# Patient Record
Sex: FEMALE | Race: WHITE
[De-identification: names, ages, dates, MRNs, and addresses within clinical notes are randomized per-mention and may not be internally consistent; named-entity substitution may affect disease eponyms.]

---

## 2017-02-28 ENCOUNTER — HOSPITAL ENCOUNTER (OUTPATIENT)
Dept: HOSPITAL 62 - LC | Age: 37
Discharge: HOME | End: 2017-02-28
Attending: OBSTETRICS & GYNECOLOGY
Payer: COMMERCIAL

## 2017-02-28 DIAGNOSIS — Z3A.37: ICD-10-CM

## 2017-02-28 DIAGNOSIS — O09.523: Primary | ICD-10-CM

## 2017-02-28 PROCEDURE — 4A1HXCZ MONITORING OF PRODUCTS OF CONCEPTION, CARDIAC RATE, EXTERNAL APPROACH: ICD-10-PCS | Performed by: OBSTETRICS & GYNECOLOGY

## 2017-02-28 PROCEDURE — 59025 FETAL NON-STRESS TEST: CPT

## 2017-02-28 NOTE — NON STRESS TEST REPORT
=================================================================

Non Stress Test

=================================================================

Datetime Report Generated by CPN: 02/28/2017 11:15

   

   

=================================================================

DEMOGRAPHIC

=================================================================

   

EGA NST:  37.2

   

=================================================================

INDICATION

=================================================================

   

Indication for Study:  Other

Indication for Study (NST) Other:  AMA

   

=================================================================

MONITORING

=================================================================

   

Monitor Explained:  Monitor Explained; Test Explained; Patient

   Verbalized Understanding

Time on Monitor:  02/28/2017 10:46

Time off Monitor:  02/28/2017 11:10

NST Duration:  24

   

=================================================================

NST INTERVENTIONS

=================================================================

   

NST Interventions:  PO Hydration; Reposition Patient

Physician Notified NST:  A EMMEL, CNM

BABY A:  [id number]

   

=================================================================

BABY A

=================================================================

   

Fetal Movement :  Present

Contraction Frequency :  NONE

FHR Baseline :  135

Accelerations :  15X15

Decelerations :  None

Variability :  Moderate 6-25bpm

NST Review:  Meets Criteria for Reactive NST

NST Review and Verified By :  LAM Barnesavadominga RNC

NST Results:  Reactive

   

=================================================================

NST REPORT

=================================================================

   

Report Trigger:  Send Report

## 2017-03-14 ENCOUNTER — HOSPITAL ENCOUNTER (OUTPATIENT)
Dept: HOSPITAL 62 - LC | Age: 37
Discharge: HOME | End: 2017-03-14
Attending: OBSTETRICS & GYNECOLOGY
Payer: COMMERCIAL

## 2017-03-14 DIAGNOSIS — Z3A.39: ICD-10-CM

## 2017-03-14 DIAGNOSIS — O09.523: Primary | ICD-10-CM

## 2017-03-14 PROCEDURE — 59025 FETAL NON-STRESS TEST: CPT

## 2017-03-14 PROCEDURE — 4A1HXCZ MONITORING OF PRODUCTS OF CONCEPTION, CARDIAC RATE, EXTERNAL APPROACH: ICD-10-PCS | Performed by: OBSTETRICS & GYNECOLOGY

## 2017-03-22 ENCOUNTER — HOSPITAL ENCOUNTER (INPATIENT)
Dept: HOSPITAL 62 - LR | Age: 37
LOS: 3 days | Discharge: HOME | End: 2017-03-25
Attending: OBSTETRICS & GYNECOLOGY | Admitting: OBSTETRICS & GYNECOLOGY
Payer: COMMERCIAL

## 2017-03-22 DIAGNOSIS — Z3A.40: ICD-10-CM

## 2017-03-22 DIAGNOSIS — O48.0: Primary | ICD-10-CM

## 2017-03-22 LAB
ALBUMIN SERPL-MCNC: 3.9 G/DL (ref 3.5–5)
ALP SERPL-CCNC: 116 U/L (ref 38–126)
ALT SERPL-CCNC: 23 U/L (ref 9–52)
ANION GAP SERPL CALC-SCNC: 10 MMOL/L (ref 5–19)
APPEARANCE UR: (no result)
AST SERPL-CCNC: 19 U/L (ref 14–36)
BARBITURATES UR QL SCN: NEGATIVE
BASOPHILS # BLD AUTO: 0.1 10^3/UL (ref 0–0.2)
BASOPHILS NFR BLD AUTO: 0.5 % (ref 0–2)
BILIRUB DIRECT SERPL-MCNC: 0.1 MG/DL (ref 0–0.4)
BILIRUB SERPL-MCNC: 0.4 MG/DL (ref 0.2–1.3)
BILIRUB UR QL STRIP: NEGATIVE
BUN SERPL-MCNC: 9 MG/DL (ref 7–20)
CALCIUM: 10.5 MG/DL (ref 8.4–10.2)
CHLORIDE SERPL-SCNC: 106 MMOL/L (ref 98–107)
CO2 SERPL-SCNC: 23 MMOL/L (ref 22–30)
CREAT SERPL-MCNC: 0.46 MG/DL (ref 0.52–1.25)
EOSINOPHIL # BLD AUTO: 0.1 10^3/UL (ref 0–0.6)
EOSINOPHIL NFR BLD AUTO: 1.3 % (ref 0–6)
ERYTHROCYTE [DISTWIDTH] IN BLOOD BY AUTOMATED COUNT: 14.5 % (ref 11.5–14)
GLUCOSE SERPL-MCNC: 91 MG/DL (ref 75–110)
GLUCOSE UR STRIP-MCNC: NEGATIVE MG/DL
HCT VFR BLD CALC: 38.2 % (ref 36–47)
HGB BLD-MCNC: 12.6 G/DL (ref 12–15.5)
HGB HCT DIFFERENCE: -0.4
KETONES UR STRIP-MCNC: NEGATIVE MG/DL
LDH1 SERPL-CCNC: 321 U/L (ref 313–618)
LYMPHOCYTES # BLD AUTO: 2.1 10^3/UL (ref 0.5–4.7)
LYMPHOCYTES NFR BLD AUTO: 18.2 % (ref 13–45)
MCH RBC QN AUTO: 31.2 PG (ref 27–33.4)
MCHC RBC AUTO-ENTMCNC: 33.1 G/DL (ref 32–36)
MCV RBC AUTO: 94 FL (ref 80–97)
METHADONE UR QL SCN: NEGATIVE
MONOCYTES # BLD AUTO: 1 10^3/UL (ref 0.1–1.4)
MONOCYTES NFR BLD AUTO: 8.7 % (ref 3–13)
NEUTROPHILS # BLD AUTO: 8 10^3/UL (ref 1.7–8.2)
NEUTS SEG NFR BLD AUTO: 71.3 % (ref 42–78)
NITRITE UR QL STRIP: NEGATIVE
PCP UR QL SCN: NEGATIVE
PH UR STRIP: 7 [PH] (ref 5–9)
POTASSIUM SERPL-SCNC: 4.7 MMOL/L (ref 3.6–5)
PROT SERPL-MCNC: 6.7 G/DL (ref 6.3–8.2)
PROT UR STRIP-MCNC: NEGATIVE MG/DL
RBC # BLD AUTO: 4.05 10^6/UL (ref 3.72–5.28)
SODIUM SERPL-SCNC: 139.1 MMOL/L (ref 137–145)
SP GR UR STRIP: 1.01
URATE SERPL-MCNC: 4.1 MG/DL (ref 2.5–7)
URINE OPIATES LOW: NEGATIVE
UROBILINOGEN UR-MCNC: NEGATIVE MG/DL (ref ?–2)
WBC # BLD AUTO: 11.3 10^3/UL (ref 4–10.5)

## 2017-03-22 PROCEDURE — 80053 COMPREHEN METABOLIC PANEL: CPT

## 2017-03-22 PROCEDURE — 36415 COLL VENOUS BLD VENIPUNCTURE: CPT

## 2017-03-22 PROCEDURE — 86900 BLOOD TYPING SEROLOGIC ABO: CPT

## 2017-03-22 PROCEDURE — 84550 ASSAY OF BLOOD/URIC ACID: CPT

## 2017-03-22 PROCEDURE — 81001 URINALYSIS AUTO W/SCOPE: CPT

## 2017-03-22 PROCEDURE — 86592 SYPHILIS TEST NON-TREP QUAL: CPT

## 2017-03-22 PROCEDURE — 85027 COMPLETE CBC AUTOMATED: CPT

## 2017-03-22 PROCEDURE — 83615 LACTATE (LD) (LDH) ENZYME: CPT

## 2017-03-22 PROCEDURE — 86901 BLOOD TYPING SEROLOGIC RH(D): CPT

## 2017-03-22 PROCEDURE — 86850 RBC ANTIBODY SCREEN: CPT

## 2017-03-22 PROCEDURE — 80307 DRUG TEST PRSMV CHEM ANLYZR: CPT

## 2017-03-22 PROCEDURE — 85025 COMPLETE CBC W/AUTO DIFF WBC: CPT

## 2017-03-22 NOTE — L&D FLOW SHEET
=================================================================

LD Flowsheet

=================================================================

Datetime Report Generated by CPN: 03/22/2017 20:00

   

   

=================================================================

Datetime: 03/22/2017 19:52

=================================================================

   

   

=================================================================

Medications

=================================================================

   

 Cervical Ripening Agents:  Cytotec @ 50 mcg PO _ 25 mch PV (Crystal

   [place], RN)

   

=================================================================

Datetime: 03/22/2017 19:48

=================================================================

   

   

=================================================================

Patient Care

=================================================================

   

 I/O Interventions:  Up to BR (Crystal [place], RN)

   

=================================================================

Datetime: 03/22/2017 19:18

=================================================================

   

   

=================================================================

Maternal Assessment

=================================================================

   

 Level of Consciousness:  Fully Conscious (Crystal [place], RN)

 Headache:  Denies (Crystal Blair, RN)

 Breath Sounds, Left:  Clear and Equal (Crystal Blair, RN)

 Breath Sounds, Right:  Clear and Equal (Crystal [place], RN)

 Nausea/Vomiting:  Denies (Crystal Blair, RN)

 RUQ Epigastric Pain:  Denies (Crystal [place], RN)

   

=================================================================

Datetime: 03/22/2017 19:10

=================================================================

   

 Patient Care Comments:  18g inserted into left forearm, infusing

   without difficulty (Dorothy Broman, RN)

   

=================================================================

Datetime: 03/22/2017 19:02

=================================================================

   

   

=================================================================

Vital Signs

=================================================================

   

 NBP Sys/Florence/Mean (mmHg):  125 (QS system process)

:  85 (QS system process)

:  100 (QS system process)

 Pulse:  93 (QS system process)

   

=================================================================

Communication

=================================================================

   

 LaborFlag:  OB Triage (QS system process)

   

=================================================================

Datetime: 03/22/2017 18:58

=================================================================

   

   

=================================================================

Uterine Activity

=================================================================

   

 Monitor Interventions for UA:  [place] Adjusted (Dorothy Broman, RN)

   

=================================================================

Fetal Assessment A

=================================================================

   

 Monitor Mode:  External US (Dorothy Broman, RN)

 Monitor Interventions for FHR:  Ultrasound Adjusted (Dorothy Broman,

   RN)

 FHR Baseline Rate :  130 (Dorothy Broman, RN)

 Variability:  Moderate 6-25 bpm (Dorothy Broman, RN)

 Accelerations:  15X15 (Dorothy Broman, RN)

 Decelerations:  None (Dorothy Broman, RN)

   

=================================================================

Pain

=================================================================

   

 Pain Scale:  0 (Dorothy Broman, RN)

 Pain Presence:  None/Denies (Dorothy Broman, RN)

 Pain Type:  N/A (Dorothy Broman, RN)

 Pain Relief Measures:  Comfort Measures (Dorothy Broman, RN)

   

=================================================================

Vaginal Exam

=================================================================

   

 Vaginal Bleeding:  None (Dorothy Broman, RN)

   

=================================================================

Maternal Assessment

=================================================================

   

 Level of Consciousness:  Fully Conscious (Dorothy Broman, RN)

 DTR's/Clonus:  DTRs 2+; No Clonus (Dorothy Broman, RN)

 Headache:  Denies (Dorothy Broman, RN)

 Breath Sounds, Left:  Clear and Equal (Dorothy Broman, RN)

 Breath Sounds, Right:  Clear and Equal (Dorothy Broman, RN)

 Nausea/Vomiting:  Denies (Dorothy Broman, RN)

 RUQ Epigastric Pain:  Denies (Dorothy Broman, RN)

   

=================================================================

Teaching

=================================================================

   

 Instructional Method:  Verbal (Dorothy Sharma RN)

 Plan of Care:  Plan of Care Discussed; Vaginal Delivery; Labor;

   Induction (Dorothy Sharma RN)

 Unit Routine:  Maria Stein to Room; Call Barnes; Bed; Visiting Policy;

   Waiting Areas (Dorothy Sharma RN)

 Labor/Induction:  Labor Stages; Cervical Ripening; Induction (Dorothy

   Broman, RN)

 Pain Management:  Pain Scale/Goals; Comfort Measures (Dorothy Sharma,

   RN)

 PTL/PROM:  Signs/Symptoms of Infection (Dorothy Sharma, RN)

 Pregnancy Related:  Common Discomforts of Pregnancy; Hydration;

   Activity and Rest (Dorothy Sharma, IZABELLA)

   

=================================================================

Communication

=================================================================

   

 LaborFlag:  OB Triage (QS system process)

   

=================================================================

Datetime: 03/14/2017 16:03

=================================================================

   

 Temperature (C):  36.7 (QS system process)

   

=================================================================

Communication

=================================================================

   

 LaborFlag:  OB Triage (QS system process)

## 2017-03-22 NOTE — L&D FLOW SHEET
=================================================================

LD Flowsheet

=================================================================

Datetime Report Generated by CPN: 03/22/2017 20:00

   

   

=================================================================

Datetime: 03/22/2017 19:52

=================================================================

   

   

=================================================================

Medications

=================================================================

   

 Cervical Ripening Agents:  Cytotec @ 50 mcg PO _ 25 mch PV (Crystal

   [place], RN)

   

=================================================================

Datetime: 03/22/2017 19:48

=================================================================

   

   

=================================================================

Patient Care

=================================================================

   

 I/O Interventions:  Up to BR (Crystal [place], RN)

   

=================================================================

Datetime: 03/22/2017 19:18

=================================================================

   

   

=================================================================

Maternal Assessment

=================================================================

   

 Level of Consciousness:  Fully Conscious (Crystal [place], RN)

 Headache:  Denies (Crystal Blair, RN)

 Breath Sounds, Left:  Clear and Equal (Crystal Blair, RN)

 Breath Sounds, Right:  Clear and Equal (Crystal [place], RN)

 Nausea/Vomiting:  Denies (Crystal Blair, RN)

 RUQ Epigastric Pain:  Denies (Crystal [place], RN)

   

=================================================================

Datetime: 03/22/2017 19:10

=================================================================

   

 Patient Care Comments:  18g inserted into left forearm, infusing

   without difficulty (Dorothy Broman, RN)

   

=================================================================

Datetime: 03/22/2017 19:02

=================================================================

   

   

=================================================================

Vital Signs

=================================================================

   

 NBP Sys/Florence/Mean (mmHg):  125 (QS system process)

:  85 (QS system process)

:  100 (QS system process)

 Pulse:  93 (QS system process)

   

=================================================================

Communication

=================================================================

   

 LaborFlag:  OB Triage (QS system process)

   

=================================================================

Datetime: 03/22/2017 18:58

=================================================================

   

   

=================================================================

Uterine Activity

=================================================================

   

 Monitor Interventions for UA:  [place] Adjusted (Dorothy Broman, RN)

   

=================================================================

Fetal Assessment A

=================================================================

   

 Monitor Mode:  External US (Dorothy Broman, RN)

 Monitor Interventions for FHR:  Ultrasound Adjusted (Dorothy Broman,

   RN)

 FHR Baseline Rate :  130 (Dorothy Broman, RN)

 Variability:  Moderate 6-25 bpm (Dorothy Broman, RN)

 Accelerations:  15X15 (Dorothy Broman, RN)

 Decelerations:  None (Dorothy Broman, RN)

   

=================================================================

Pain

=================================================================

   

 Pain Scale:  0 (Dorothy Broman, RN)

 Pain Presence:  None/Denies (Dorothy Broman, RN)

 Pain Type:  N/A (Dorothy Broman, RN)

 Pain Relief Measures:  Comfort Measures (Dorothy Broman, RN)

   

=================================================================

Vaginal Exam

=================================================================

   

 Vaginal Bleeding:  None (Dorothy Broman, RN)

   

=================================================================

Maternal Assessment

=================================================================

   

 Level of Consciousness:  Fully Conscious (Dorothy Broman, RN)

 DTR's/Clonus:  DTRs 2+; No Clonus (Dorothy Broman, RN)

 Headache:  Denies (Dorothy Broman, RN)

 Breath Sounds, Left:  Clear and Equal (Dorothy Broman, RN)

 Breath Sounds, Right:  Clear and Equal (Dorothy Broman, RN)

 Nausea/Vomiting:  Denies (Dorothy Broman, RN)

 RUQ Epigastric Pain:  Denies (Dorothy Broman, RN)

   

=================================================================

Teaching

=================================================================

   

 Instructional Method:  Verbal (Dorothy Sharma RN)

 Plan of Care:  Plan of Care Discussed; Vaginal Delivery; Labor;

   Induction (Dorothy Sharma RN)

 Unit Routine:  White to Room; Call Barnes; Bed; Visiting Policy;

   Waiting Areas (Dorothy Sharma RN)

 Labor/Induction:  Labor Stages; Cervical Ripening; Induction (Dorothy Sharma RN)

 Pain Management:  Pain Scale/Goals; Comfort Measures (Dorothy Sharma RN)

 PTL/PROM:  Signs/Symptoms of Infection (Dorothy Sharma RN)

 Pregnancy Related:  Common Discomforts of Pregnancy; Hydration;

   Activity and Rest (Dorothy Sharma RN)

   

=================================================================

Communication

=================================================================

   

 LaborFlag:  OB Triage (QS system process)

## 2017-03-23 PROCEDURE — 4A1HXCZ MONITORING OF PRODUCTS OF CONCEPTION, CARDIAC RATE, EXTERNAL APPROACH: ICD-10-PCS | Performed by: OBSTETRICS & GYNECOLOGY

## 2017-03-23 PROCEDURE — 3E0P7GC INTRODUCTION OF OTHER THERAPEUTIC SUBSTANCE INTO FEMALE REPRODUCTIVE, VIA NATURAL OR ARTIFICIAL OPENING: ICD-10-PCS | Performed by: OBSTETRICS & GYNECOLOGY

## 2017-03-23 PROCEDURE — 0HQ9XZZ REPAIR PERINEUM SKIN, EXTERNAL APPROACH: ICD-10-PCS | Performed by: OBSTETRICS & GYNECOLOGY

## 2017-03-23 RX ADMIN — ACETAMINOPHEN AND CODEINE PHOSPHATE PRN EACH: 300; 30 TABLET ORAL at 21:55

## 2017-03-23 RX ADMIN — IBUPROFEN SCH MG: 800 TABLET, FILM COATED ORAL at 21:11

## 2017-03-23 NOTE — ADMISSION PHYSICAL
=================================================================



=================================================================

Datetime Report Generated by CPN: 2017 18:23

   

   

=================================================================

CURRENT ADMISSION

=================================================================

   

Chief Complaint:  Scheduled Induction of Labor

Indication for Induction:  Post Dates; Gest. HTN/PreEclampsia/Eclampsia

Admit Plan:  Admit to Unit; Initiate Labor Induction Protocol

   

=================================================================

ALLERGIES

=================================================================

   

Medication Allergies:  No

Medication Allergies:  No Known Allergies (2017)

Latex:  No Latex Allergies

Food Allergies:  none

Environmental Allergies:  none

   

=================================================================

OBSTETRICAL HISTORY

=================================================================

   

EDC:  2017 00:00

:  4

Para:  2

Term:  2

SAB:  1

Livin

Cesareans:  0

VBACs:  0

Multiple Births:  0

Gestational Diabetes:  No

Rh Sensitization:  No

Incompetent Cervix:  No

MELISSA:  No

Infertility:  No

ART Treatment:  No

Uterine Anomaly:  No

IUGR:  No

Hx Previous C/S:  No

Macrosomia:  No

Hx Loss/Stillborn:  No

PIH:  No

Hx  Death:  No

Placenta Previa/Abruption:  No

Depression/PP Depression:  No

PTL/PROM:  No

Post Partum Hemorrhage:  No

Current Pregnancy Procedures:  Ultrasound; NST

Obstetrical History Comments:  G1 2007 

   G2 2009 

   G3  SAB

   G4 current GHTN, AMA

      

   

=================================================================

***SEE PRENATAL RECORDS***

=================================================================

   

Alcohol:  No

Marijuana :  No

Cocaine:  No

Other Illicit Drugs:  No

Cigarettes:  Never Smoker. [id number]

   

=================================================================

MEDICAL HISTORY

=================================================================

   

Diabetes:  No

Blood Transfusion:  No

Pulmonary Disease (Asthma, TB):  No

Breast Disease:  No

Hypertension:  No

Gyn Surgery:  No

Heart Disease:  No

Hosp/Surgery:  No

Autoimmune Disorder:  No

Anesthetic Complications:  No

Kidney Disease:  No

Abnormal Pap Smear:  No

Neuro/Epilepsy:  No

Psychiatric Disorders:  No

Other Medical Diseases:  No

Hepatitis/Liver Disease:  No

Significant Family History:  No

Varicosities/Phlebitis:  No

Trauma/Violence :  No

Thyroid Dysfunction:  No

   

=================================================================

INFECTIOUS HISTORY

=================================================================

   

Gonorrhea:  No

Genital Herpes:  No

Chlamydia:  No

Tuberculosis:  No

Syphilis:  No

Hepatitis:  No

HIV/AIDS Exposure:  No

Rash or Viral Illness:  No

HPV:  No

   

=================================================================

PHYSICAL EXAM

=================================================================

   

General:  Normal

HEENT:  Normal

Neurologic:  Normal

Thyroid:  Normal

Heart:  Normal

Lungs:  Normal

Breast:  Normal

Back:  Normal

Abdomen:  Normal

Genitourinary Exam:  Normal

Extremities:  Normal

DTRs:  Normal

Pelvic Type:  Adequate

Vital Signs:  Reviewed; Within Normal Limits

   

=================================================================

VAGINAL EXAM

=================================================================

   

Dilatation:  1

Effacement:  25

Station:  -3

   

=================================================================

MEMBRANES

=================================================================

   

Pooling:  Negative

Membranes:  Intact

   

=================================================================

FETUS A

=================================================================

   

EGA:  40.3

Monitoring:  External US

FHR- Baseline:  150

Variability:  Moderate 6-25bpm

Accelerations:  15X15

Decelerations:  None

FHR Category:  Category I

Estimated Fetal Weight (gm):  4000

Fetal Presentation:  Vertex

   

=================================================================

PLANS FOR LABOR AND DELIVERY

=================================================================

   

Labor and Delivery:  None

Pain Management:  None

Feeding Preference:  Formula

Benefit of Breast Feed Discussed:  Yes

Circumcision:  N/A

   

=================================================================

INFORMED CONSENT

=================================================================

   

Signature:  Electronically signed by MD Lesa WaggonerANDDO) on

   3/22/2017 at 19:46  with User ID: [email or web address]

## 2017-03-23 NOTE — DELIVERY SUMMARY
=================================================================

Del Sum A-C

=================================================================

Datetime Report Generated by CPN: 2017 17:16

   

   

=================================================================

ADMISSION DATA

=================================================================

   

Chief Complaint:  Scheduled Induction of Labor

Indication for Induction:  Post Dates; Gest. HTN/PreEclampsia/Eclampsia

Admission Impression:  Term, Intrauterine Pregnancy; Induction of Labor

   

=================================================================

DELIVERY PERSONNEL

=================================================================

   

Delivery Doctor::  Lenore Poe CNM

Labor and Delivery Nurse::  Dorothy Sharma, RN

Labor and Delivery Nurse::  Nancy Casper, RN

Scrub Tech/CNA:  Marry Perez, ST

   

=================================================================

MATERNAL INFORMATION

=================================================================

   

Delivery Anesthesia:  None

Medications After Delivery:  Pitocin Drip 20 Units/1000ml NSS

Estimated  Blood Loss (ml):  200

Maternal Complications:  None

Provider Comments:  SVDVF with loose nuchal cord reduced.  Infant

   vigorous, to mothers abd.  Cord clamped x2 cut per FOB, 3VC, cord

   blood collected.  Placenta intact via merrill with trailing

   membranes.  1*perineal lac repaired with 1%lidocaine and 3.0

   chromic.  , Apgars 9,9.  Mother and infant stable. 

   

=================================================================

LABOR SUMMARY

=================================================================

   

EDC:  2017 00:00

No. Babies in Womb:  1

 Attempted:  No

Labor Anesthesia:  None

   

=================================================================

LABOR INFORMATION

=================================================================

   

Reason for Induction:  Postterm; Gestational Hypertension

Onset of Labor:  2017 12:09

Complete Dilatation:  2017 15:38

Cervical Ripening Agents:  Cytotec @

Oxytocin:  Induction

Group B Beta Strep:  Neg

Antibiotics # of Doses:  0

Steroids Given:  None

Reason Steroids Not Administered:  Not Applicable

   

=================================================================

MEMBRANES

=================================================================

   

Membranes Rupture Method:  Artificial

Rupture of Membranes:  2017 12:09

Length of Rupture (hr):  3.53

Amniotic Fluid Color:  Clear

Amniotic Fluid Amount:  Large

Amniotic Fluid Odor:  Normal

   

=================================================================

STAGES OF LABOR

=================================================================

   

Stage 1 hr:  3

Stage 1 min:  29

Stage 2 hr:  0

Stage 2 min:  3

Stage 3 hr:  0

Stage 3 min:  7

Total Time in Labor hr:  3

Total Time in Labor min:  39

   

=================================================================

VAGINAL DELIVERY

=================================================================

   

Episiotomy:  None

Laceration Extension:  First Degree

Laceration Type:  Perineal

Laceration Repair:  Yes

Laceration Repair Note:  3.0 chromic, 2 interrupted stitches 1*perineal

Sponge Count Correct:  N/A

   

=================================================================

BABY A INFORMATION

=================================================================

   

Infant Delivery Date/Time:  2017 15:41

Method of Delivery:  Vaginal

Born in Route :  No

:  N/A

Forceps:  N/A

Vacuum Extraction:  N/A

Shoulder Dystocia :  No

   

=================================================================

PRESENTATION/POSITION BABY A

=================================================================

   

Presentation:  Cephalic

Cephalic Presentation:  Vertex

Vertex Position:  Left Occipital Anterior

Breech Presentation:  N/A

   

=================================================================

PLACENTA INFORMATION BABY A

=================================================================

   

Placenta Delivery Time :  2017 15:48

Placenta Method of Delivery:  Spontaneous

Placenta Status:  Delivered

   

=================================================================

APGAR SCORES BABY A

=================================================================

   

Heart Rate 1 min:  >100 bpm

Resp Effort 1 min:  Good Cry

Reflex Irritability 1 min:  Cough or Sneeze or Pulls Away

Muscle Tone 1 min:  Active Motion

Color 1 min:  Body Pink, Extremities Blue

Resuscitation Effort 1 min:  N/A

APGAR SCORE 1 MIN:  9

Heart Rate 5 min:  >100 bpm

Resp Effort 5 min:  Good Cry

Reflex Irritability 5 min:  Cough or Sneeze or Pulls Away

Muscle Tone 5 min:  Active Motion

Color 5 min:  Body Pink, Extremities Blue

Resuscitation Effort 5 min:  N/A

APGAR SCORE 5 MIN:  9

   

=================================================================

INFANT INFORMATION BABY A

=================================================================

   

Gestational Age at Delivery:  40.4

Gestational Status:  Full Term- 39- 40.6 Weeks

Infant Outcome :  Liveborn

Infant Condition :  Stable

Infant Sex:  Female

   

=================================================================

IDENTIFICATION BABY A

=================================================================

   

Infant Verification Date/Time:  2017 15:56

ID Band Number:  [id number]

Mother's Name Verified:  Yes

Infant Medical Record Number:  [id number]

RN Verifying Infant:  B Roulund RN/ S Camp RNC

   

=================================================================

WEIGHT/LENGTH BABY A

=================================================================

   

Infant Birthweight (gm):  3300

Infant Weight (lb):  7

Infant Weight (oz):  4

Infant Length (in):  20.00

Infant Length (cm):  50.80

   

=================================================================

CORD INFORMATION BABY A

=================================================================

   

No. Cord Vessels:  3

Nuchal Cord :  Around Neck x1, Loose

Cord Blood Taken:  Yes-For Storage (Mom's Blood type +)

Infant Suction:  None

   

=================================================================

ASSESSMENT BABY A

=================================================================

   

Infant Complications:  None

Physical Findings at Delivery:  Within Normal Limits

Infant Respirations:  Appears Normal

Skin to Skin:  Yes

Skin to Skin Time (min):  5

Neonatologist/ALS Called :  No

Infant Care By:  PRACHI Sharma RN

Transferred To:  Remains with Mother

   

=================================================================

SIGNATURES

=================================================================

   

Assignment:  Mary Strong MD

Signature:  Electronically signed by Lenore Poe CNM on 3/23/2017 at

   16:07  with User ID: [email or web address]

:  Electronically signed by Lenore Poe CNM on 3/23/2017 at 16:07 

   with User ID: [email or web address]

:  I was personally available for consultation and serving as

   supervising physician for the MLP.

## 2017-03-23 NOTE — L&D FLOW SHEET
=================================================================

LD Flowsheet

=================================================================

Datetime Report Generated by CPN: 03/23/2017 08:00

   

   

=================================================================

Datetime: 03/23/2017 07:49

=================================================================

   

 Respirations:  18 (Dorothy Broman, RN)

 Temperature (F):  97.9 (Dorothy Broman, RN)

 Temperature (C):  36.6 (QS system process)

 Temperature Route:  Oral (Dorothy Broman, RN)

 Pain Scale:  2 (Dorothy Broman, RN)

 Pain Presence:  Intermittent (Dorothy Broman, RN)

 Pain Type:  Cramping (Dorothy Broman, RN)

 Pain Location:  Abdomen (Dorothy Broman, RN)

 Pain Goal:  0 (Dorothy Broman, RN)

 Pain Relief Measures:  Comfort Measures (Dorothy Broman, RN)

 Pain Coping:  Declines Medication or Epidural (Dorothy Broman, RN)

 LaborFlag:  Labor (QS system process)

   

=================================================================

Datetime: 03/23/2017 07:48

=================================================================

   

 Level of Consciousness:  Fully Conscious (Dorothy Broman, RN)

 DTR's/Clonus:  DTRs 2+; No Clonus (Dorothy Broman, RN)

 Headache:  Denies (Dorothy Broman, RN)

 Breath Sounds, Left:  Clear and Equal (Dorothy Broman, RN)

 Breath Sounds, Right:  Clear and Equal (Dorothy Broman, RN)

 Nausea/Vomiting:  Denies (Dorothy Broman, RN)

 RUQ Epigastric Pain:  Denies (Dorothy Broman, RN)

   

=================================================================

Datetime: 03/23/2017 06:26

=================================================================

   

 Monitor Mode:  External (Crystal Blair, RN)

 Frequency (min):  2-5 (Crystal White Bird, RN)

 Quality:  Mild/Moderate (Crystal White Bird, RN)

 Duration (sec):  60-90 (Crystal White Bird, RN)

 Duration Criteria:  Less than Two 120 Second Contractions (Crystal

   Blair, RN)

 Resting Tone (Palpate):  Relaxed (Crystal White Bird, RN)

 Monitor Mode:  External US (Crystal Blair, RN)

 FHR Baseline Rate :  120 (Crystal Blair, RN)

 Variability:  Moderate 6-25 bpm (Crystal Blair, RN)

 Communication Comments:  Monitors DC for pt to shower and eat

   breakfast.  Will resume monitoring after breakfast.  (Crystal

   White Bird, RN)

   

=================================================================

Datetime: 03/23/2017 06:25

=================================================================

   

 I/O Interventions:  Up to BR (Crystal White Bird, RN)

   

=================================================================

Datetime: 03/23/2017 06:14

=================================================================

   

 NBP Sys/Florence/Mean (mmHg):  129 (QS system process)

:  74 (QS system process)

:  95 (QS system process)

 Pulse:  77 (QS system process)

 LaborFlag:  Labor (QS system process)

   

=================================================================

Datetime: 03/23/2017 06:12

=================================================================

   

Stage of Pregnancy:  Labor (Crystal Blair, RN)

 Strip Reviewed by:  AMERICA Pinto RN  (Nelly Pinto RN)

 Communication:  RN Reviewed Strip; Provider Orders Received; Call/Page

   Placed to Provider (Nelly Pinto RN)

 Provider Notified (Name):  Jayro (Nelly Pinto RN)

 Notification Reason:  Status Update (Nelly Pinto RN)

 Communication Comments:  VE report given to Dr. Dial , received

   orders to start pitocin IV.  (Nelly Pinto RN)

   

=================================================================

Datetime: 03/23/2017 06:03

=================================================================

   

 Dilatation (cm):  1.0 (Nelly Pinto RN)

 Effacement (%):  25 (Nelly Pinto RN)

 Station:  -3 (Nelly Pinto RN)

 Exam by:  AMERICA Pinto RN  (Nelly Pinto RN)

 Vaginal Bleeding:  None (Nelly Pinto RN)

 Cervix, Consistency:  Soft (Nelly Pinto RN)

 Cervix, Position:  Posterior (Nelly Pinto RN)

   

=================================================================

Datetime: 03/23/2017 06:00

=================================================================

   

 Monitor Mode:  External; Palpation (Crystal White Bird, RN)

 Frequency (min):  1.5-4 (Crystal Blair, RN)

 Quality:  Mild/Moderate (Crystal Blair, RN)

 Duration (sec):  70-90 (Crystal White Bird, RN)

 Duration Criteria:  Less than Two 120 Second Contractions (Crystal

   Blair, RN)

 Resting Tone (Palpate):  Relaxed (Crystal Blair, RN)

 Monitor Mode:  External US (Crystal Blair, RN)

 FHR Baseline Rate :  125 (Crystal White Bird, RN)

 Variability:  Moderate 6-25 bpm (Crystal Blair, RN)

 Accelerations:  15X15 (Crystal White Bird, RN)

   

=================================================================

Datetime: 03/23/2017 05:47

=================================================================

   

 Monitor Interventions for FHR:  Ultrasound Adjusted (Crystal

   Blair, RN)

   

=================================================================

Datetime: 03/23/2017 05:33

=================================================================

   

 I/O Interventions:  Up to BR (Crystal White Bird, RN)

   

=================================================================

Datetime: 03/23/2017 05:30

=================================================================

   

 Monitor Mode:  External (Crystal Blair, RN)

 Frequency (min):  1-3 (Crystal Blair, RN)

 Quality:  Mild/Moderate (Crystal White Bird, RN)

 Duration (sec):  70-90 (Crystal White Bird, RN)

 Duration Criteria:  Less than Two 120 Second Contractions (Crystal

   White Bird, RN)

 Resting Tone (Palpate):  Relaxed (Crystal White Bird, RN)

 Monitor Mode:  External US (Crystal Blair, RN)

 FHR Baseline Rate :  120 (Crystal Blair, RN)

 Variability:  Moderate 6-25 bpm (Crystal White Bird, RN)

   

=================================================================

Datetime: 03/23/2017 05:13

=================================================================

   

 NBP Sys/Florence/Mean (mmHg):  118 (QS system process)

:  71 (QS system process)

:  89 (QS system process)

 Pulse:  74 (QS system process)

 LaborFlag:  OB Triage (QS system process)

   

=================================================================

Datetime: 03/23/2017 05:00

=================================================================

   

 Monitor Mode:  External; Palpation (Crystal White Bird, RN)

 Frequency (min):  1-4 (Crystal White Bird, RN)

 Quality:  Mild/Moderate (Crystal Blair, RN)

 Duration (sec):  60-80 (Crystal White Bird, RN)

 Duration Criteria:  Less than Two 120 Second Contractions (Crystal

   White Bird, RN)

 Resting Tone (Palpate):  Relaxed (Crystal White Bird, RN)

 Monitor Mode:  External US (Crystal Blair, RN)

 FHR Baseline Rate :  120 (Crystal White Bird, RN)

 Variability:  Moderate 6-25 bpm (Crystal Blair, RN)

 Accelerations:  15X15 (Crystal White Bird, RN)

 Patient Position/Activity:  Semi-Fowlers (Crystal Blair, RN)

   

=================================================================

Datetime: 03/23/2017 04:30

=================================================================

   

 Monitor Mode:  External; Palpation (Crystal Blair, RN)

 Frequency (min):  1-3.5 (Crystal White Bird, RN)

 Quality:  Mild/Moderate (Crystal White Bird, RN)

 Duration (sec):   (Crystal White Bird, RN)

 Duration Criteria:  Less than Two 120 Second Contractions (Crystal

   White Bird, RN)

 Resting Tone (Palpate):  Relaxed (Crystal White Bird, RN)

 Monitor Mode:  External US (Crystal White Bird, RN)

 FHR Baseline Rate :  120 (Crystal Blair, RN)

 Variability:  Moderate 6-25 bpm (Crystal White Bird, RN)

 Accelerations:  15X15 (Crystal Blair, RN)

   

=================================================================

Datetime: 03/23/2017 04:15

=================================================================

   

 Monitor Interventions for FHR:  Ultrasound Adjusted (Crystal

   White Bird, RN)

 Comments:  Reading moms pulse (Crystal Blair, RN)

   

=================================================================

Datetime: 03/23/2017 04:13

=================================================================

   

 NBP Sys/Florence/Mean (mmHg):  128 (QS system process)

:  77 (QS system process)

:  96 (QS system process)

 Pulse:  73 (QS system process)

 LaborFlag:  OB Triage (QS system process)

   

=================================================================

Datetime: 03/23/2017 04:00

=================================================================

   

 Monitor Mode:  External; Palpation (Crystal Blair, RN)

 Frequency (min):  2-3 (Crystal White Bird, RN)

 Quality:  Mild/Moderate (Crystal Blair, RN)

 Duration (sec):  60-90 (Crystal White Bird, RN)

 Duration Criteria:  Less than Two 120 Second Contractions (Crystal

   Blair, RN)

 Resting Tone (Palpate):  Relaxed (Crystal White Bird, RN)

 Monitor Mode:  External US (Crystal White Bird, RN)

 FHR Baseline Rate :  120 (Crystal White Bird, RN)

 Variability:  Moderate 6-25 bpm (Crystal Blair, RN)

 Accelerations:  15X15 (Crystal Blair, RN)

 Patient Position/Activity:  Left Tilt; Semi-Fowlers (Crystal

   White Bird, RN)

   

=================================================================

Datetime: 03/23/2017 03:30

=================================================================

   

 Monitor Mode:  External; Palpation (Crystal Blair, RN)

 Frequency (min):  1-5 (Crystal White Bird, RN)

 Quality:  Mild/Moderate (Crystal Blair, RN)

 Duration (sec):  50-90 (Crystal White Bird, RN)

 Duration Criteria:  Less than Two 120 Second Contractions (Crystal

   White Bird, RN)

 Resting Tone (Palpate):  Relaxed (Crystal Blair, RN)

 Monitor Mode:  External US (Crystal Blair, RN)

 FHR Baseline Rate :  120 (Crystal Blair, RN)

 Variability:  Moderate 6-25 bpm (Crystal White Bird, RN)

   

=================================================================

Datetime: 03/23/2017 03:13

=================================================================

   

 NBP Sys/Florence/Mean (mmHg):  123 (QS system process)

:  73 (QS system process)

:  92 (QS system process)

 Pulse:  78 (QS system process)

 LaborFlag:  OB Triage (QS system process)

   

=================================================================

Datetime: 03/23/2017 03:00

=================================================================

   

 Monitor Mode:  External; Palpation (Crystal Blair, RN)

 Frequency (min):  1-3 (Crystal Blair, RN)

 Quality:  Mild/Moderate (Crystal White Bird, RN)

 Duration (sec):  70-90 (Crystal Blair, RN)

 Duration Criteria:  Less than Two 120 Second Contractions (Crystal

   White Bird, RN)

 Resting Tone (Palpate):  Relaxed (Crystal White Bird, RN)

 Monitor Mode:  External US (Crystal White Bird, RN)

 FHR Baseline Rate :  120 (Crystal White Bird, RN)

 Variability:  Moderate 6-25 bpm (Crystal White Bird, RN)

 Patient Position/Activity:  Left Tilt; Semi-Fowlers (Crystal

   White Bird, RN)

   

=================================================================

Datetime: 03/23/2017 02:30

=================================================================

   

 Monitor Mode:  External; Palpation (Crystal White Bird, RN)

 Frequency (min):  1-2 (Crystal White Bird, RN)

 Quality:  Mild/Moderate (Crystal Blair, RN)

 Duration (sec):  70-80 (Crystal White Bird, RN)

 Duration Criteria:  Less than Two 120 Second Contractions (Crystal

   White Bird, RN)

 Resting Tone (Palpate):  Relaxed (Crystal Blair, RN)

 Monitor Mode:  External US (Crystal Blair, RN)

 Monitor Interventions for FHR:  Ultrasound Adjusted (Crystal

   Blair, RN)

 FHR Baseline Rate :  120 (Crystal Blair, RN)

 Variability:  Moderate 6-25 bpm (Crystal Blair, RN)

   

=================================================================

Datetime: 03/23/2017 02:22

=================================================================

   

 I/O Interventions:  Up to BR (Crystal White Bird, RN)

   

=================================================================

Datetime: 03/23/2017 02:13

=================================================================

   

 NBP Sys/Florence/Mean (mmHg):  130 (QS system process)

:  70 (QS system process)

:  94 (QS system process)

 Pulse:  75 (QS system process)

 LaborFlag:  OB Triage (QS system process)

   

=================================================================

Datetime: 03/23/2017 02:00

=================================================================

   

 Monitor Mode:  External; Palpation (Crystal Blair, RN)

 Frequency (min):  1-3 (Crystal Blair, RN)

 Quality:  Mild/Moderate (Crystal White Bird, RN)

 Duration (sec):  70-90 (Crystal Blair, RN)

 Duration Criteria:  Less than Two 120 Second Contractions (Crystal

   White Bird, RN)

 Resting Tone (Palpate):  Relaxed (Crystal White Bird, RN)

 Monitor Mode:  External US (Crystal White Bird, RN)

 FHR Baseline Rate :  130 (Crystal Blair, RN)

 Variability:  Moderate 6-25 bpm (Crystal White Bird, RN)

 Accelerations:  10X10 (Crystal White Bird, RN)

   

=================================================================

Datetime: 03/23/2017 01:55

=================================================================

   

 Cervical Ripening Agents:  Cytotec @ 25 mcg PO (Crystal White Bird,

   RN)

   

=================================================================

Datetime: 03/23/2017 01:30

=================================================================

   

 Monitor Mode:  External; Palpation (Crystal Blair, RN)

 Frequency (min):  1-3 (Crystal Blair, RN)

 Quality:  Mild/Moderate (Crystal White Bird, RN)

 Duration (sec):  70-80 (Crystal White Bird, RN)

 Duration Criteria:  Less than Two 120 Second Contractions (Crystal

   White Bird, RN)

 Resting Tone (Palpate):  Relaxed (Crystal White Bird, RN)

 Monitor Mode:  External US (Crystal White Bird, RN)

 FHR Baseline Rate :  125 (Crystal White Bird, RN)

 Variability:  Moderate 6-25 bpm (Crystal White Bird, RN)

 Accelerations:  15X15 (Crystal White Bird, RN)

 Patient Position/Activity:  Left Tilt; Semi-Fowlers (Crystal

   Blair, RN)

   

=================================================================

Datetime: 03/23/2017 01:13

=================================================================

   

 NBP Sys/Florence/Mean (mmHg):  120 (QS system process)

:  69 (QS system process)

:  89 (QS system process)

 Pulse:  80 (QS system process)

 LaborFlag:  OB Triage (QS system process)

   

=================================================================

Datetime: 03/23/2017 00:30

=================================================================

   

 Monitor Mode:  External; Palpation (Crystal White Bird, RN)

 Frequency (min):  1-3 (Crystal White Bird, RN)

 Quality:  Mild/Moderate (Crystal Blair, RN)

 Duration (sec):  70-90 (Crystal White Bird, RN)

 Duration Criteria:  Less than Two 120 Second Contractions (Crystal

   White Bird, RN)

 Resting Tone (Palpate):  Relaxed (Crystal White Bird, RN)

 Monitor Mode:  External US (Crystal White Bird, RN)

 FHR Baseline Rate :  125 (Crystal White Bird, RN)

 Variability:  Moderate 6-25 bpm (Crystal White Bird, RN)

 Accelerations:  15X15 (Crystal Blair, RN)

   

=================================================================

Datetime: 03/23/2017 00:13

=================================================================

   

 NBP Sys/Florence/Mean (mmHg):  123 (QS system process)

:  71 (QS system process)

:  91 (QS system process)

 Pulse:  77 (QS system process)

 LaborFlag:  OB Triage (QS system process)

   

=================================================================

Datetime: 03/22/2017 23:59

=================================================================

   

 Monitor Mode:  External; Palpation (Crystal Blair, RN)

 Frequency (min):  1.5-3 (Crystal Blair, RN)

 Quality:  Mild/Moderate (Crystal White Bird, RN)

 Duration (sec):   (Crystal White Bird, RN)

 Duration Criteria:  Less than Two 120 Second Contractions (Crystal

   White Bird, RN)

 Resting Tone (Palpate):  Relaxed (Crystal White Bird, RN)

 Monitor Mode:  External US (Crystal White Bird, RN)

 FHR Baseline Rate :  130 (Crystal White Bird, RN)

 Variability:  Moderate 6-25 bpm (Crystal Blair, RN)

   

=================================================================

Datetime: 03/22/2017 23:56

=================================================================

   

 Dilatation (cm):  1.0 (Crystal White Bird, RN)

 Effacement (%):  50 (Crystal White Bird, RN)

 Station:  -2 (Crystal Blair, RN)

 Exam by:  AMERICA Pinto RN (Crystal White Bird, RN)

 Vaginal Bleeding:  None (Crystal White Bird, RN)

 Cervix, Consistency:  Soft (Crystal White Bird, RN)

 Cervix, Position:  Posterior (Crystal White Bird, RN)

   

=================================================================

Datetime: 03/22/2017 23:30

=================================================================

   

 Monitor Mode:  External; Palpation (Crystal Blair, RN)

 Frequency (min):  1.5-3 (Crystal Blair, RN)

 Quality:  Mild/Moderate (Crystal White Bird, RN)

 Duration (sec):  60-80 (Crystal White Bird, RN)

 Duration Criteria:  Less than Two 120 Second Contractions (Crystal

   Blair, RN)

 Resting Tone (Palpate):  Relaxed (Crystal White Bird, RN)

 Monitor Mode:  External US (Crystal Blair, RN)

 FHR Baseline Rate :  130 (Crystal White Bird, RN)

 Patient Position/Activity:  Left Tilt; Semi-Fowlers (Crystal

   Blair, RN)

   

=================================================================

Datetime: 03/22/2017 23:15

=================================================================

   

 I/O Interventions:  Up to BR (Crystal White Bird, RN)

   

=================================================================

Datetime: 03/22/2017 23:13

=================================================================

   

 NBP Sys/Florence/Mean (mmHg):  119 (QS system process)

:  74 (QS system process)

:  92 (QS system process)

 Pulse:  75 (QS system process)

 LaborFlag:  OB Triage (QS system process)

   

=================================================================

Datetime: 03/22/2017 23:00

=================================================================

   

 Monitor Mode:  External; Palpation (Crystal Blair, RN)

 Frequency (min):  1-2.5 (Crystal White Bird, RN)

 Quality:  Mild/Moderate (Crystal Blair, RN)

 Duration (sec):  60-80 (Crystal White Bird, RN)

 Duration Criteria:  Less than Two 120 Second Contractions (Crystal

   Blair, RN)

 Resting Tone (Palpate):  Relaxed (Crystal White Bird, RN)

 Monitor Mode:  External US (Crystal White Bird, RN)

 FHR Baseline Rate :  130 (Crystal Blair, RN)

 Variability:  Moderate 6-25 bpm (Crystal White Bird, RN)

 Accelerations:  15X15 (Crystal Blair, RN)

   

=================================================================

Datetime: 03/22/2017 22:30

=================================================================

   

 Monitor Mode:  External; Palpation (Crystal Blair, RN)

 Frequency (min):  1.5-3 (Crystal White Bird, RN)

 Quality:  Mild/Moderate (Crystal Blair, RN)

 Duration (sec):   (Crystal Blair, RN)

 Duration Criteria:  Less than Two 120 Second Contractions (Crystal

   White Bird, RN)

 Resting Tone (Palpate):  Relaxed (Crystal White Bird, RN)

 Monitor Mode:  External US (Crystal Blair, RN)

 FHR Baseline Rate :  130 (Crystal White Bird, RN)

 Variability:  Moderate 6-25 bpm (Crystal Blair, RN)

 Accelerations:  15X15 (Crystal White Bird, RN)

 Patient Position/Activity:  Left Tilt; Semi-Fowlers (Crystal

   White Bird, RN)

   

=================================================================

Datetime: 03/22/2017 22:13

=================================================================

   

 NBP Sys/Florence/Mean (mmHg):  118 (QS system process)

:  69 (QS system process)

:  88 (QS system process)

 Pulse:  77 (QS system process)

 LaborFlag:  OB Triage (QS system process)

   

=================================================================

Datetime: 03/22/2017 22:00

=================================================================

   

 Monitor Mode:  External; Palpation (Crystal Blair, RN)

 Frequency (min):  2-3 (Crystal Blair, RN)

 Quality:  Mild (Crystal White Bird, RN)

 Duration (sec):   (Crystal White Bird, RN)

 Duration Criteria:  Less than Two 120 Second Contractions (Crystal

   Blair, RN)

 Resting Tone (Palpate):  Relaxed (Crystal Blair, RN)

 Monitor Mode:  External US (Crystal White Bird, RN)

 FHR Baseline Rate :  130 (Crystal White Bird, RN)

 Variability:  Moderate 6-25 bpm (Crystal White Bird, RN)

 Accelerations:  15X15 (Crystal Blair, RN)

 Patient Position/Activity:  Semi-Fowlers (Crystal Blair, RN)

   

=================================================================

Datetime: 03/22/2017 21:30

=================================================================

   

 Monitor Mode:  External; Palpation (Crystal Blair, RN)

 Frequency (min):  3-4 (Crystal Blair, RN)

 Quality:  Mild (Crystal White Bird, RN)

 Duration (sec):  60-80 (Crystal Blair, RN)

 Duration Criteria:  Less than Two 120 Second Contractions (Crystal

   Blair, RN)

 Resting Tone (Palpate):  Relaxed (Crystal White Bird, RN)

 Monitor Mode:  External US (Crystal White Bird, RN)

 FHR Baseline Rate :  130 (Crystal Blair, RN)

 Variability:  Moderate 6-25 bpm (Crystal White Bird, RN)

 Accelerations:  15X15 (Crystal Blair, RN)

 Patient Position/Activity:  Left Tilt; Semi-Fowlers (Crystal

   White Bird, RN)

   

=================================================================

Datetime: 03/22/2017 21:13

=================================================================

   

 NBP Sys/Florence/Mean (mmHg):  134 (QS system process)

:  71 (QS system process)

:  95 (QS system process)

 Pulse:  78 (QS system process)

 I/O Interventions:  Up to BR (Crystal Blair, RN)

 LaborFlag:  OB Triage (QS system process)

   

=================================================================

Datetime: 03/22/2017 21:00

=================================================================

   

 Monitor Mode:  External; Palpation (Crystal White Bird, RN)

 Frequency (min):  2-3.5 (Crystal White Bird, RN)

 Quality:  Mild (Crystal Blair, RN)

 Duration (sec):   (Crystal White Bird, RN)

 Duration Criteria:  Less than Two 120 Second Contractions (Crystal

   Blair, RN)

 Resting Tone (Palpate):  Relaxed (Crystal White Bird, RN)

 Monitor Mode:  External US (Crystal Blair, RN)

 FHR Baseline Rate :  130 (Crystal White Bird, RN)

 Variability:  Moderate 6-25 bpm (Crystal Blair, RN)

 Accelerations:  15X15 (Crystal White Bird, RN)

 Patient Position/Activity:  Left Tilt; Semi-Fowlers (Crystal

   White Bird, RN)

   

=================================================================

Datetime: 03/22/2017 20:30

=================================================================

   

 Monitor Mode:  External; Palpation (Crystal Blair, RN)

 Frequency (min):  2-5 (Crystal Blair, RN)

 Quality:  Mild (Crystal White Bird, RN)

 Duration (sec):   (Crystal Blair, RN)

 Duration Criteria:  Less than Two 120 Second Contractions (Crystal

   Blair, RN)

 Resting Tone (Palpate):  Relaxed (Crystal White Bird, RN)

 Monitor Mode:  External US (Crystal Blair, RN)

 FHR Baseline Rate :  130 (Crystal Blair, RN)

 Variability:  Moderate 6-25 bpm (Crystal White Bird, RN)

 Accelerations:  15X15 (Crystal White Bird, RN)

   

=================================================================

Datetime: 03/22/2017 20:18

=================================================================

   

 NBP Sys/Florence/Mean (mmHg):  114 (QS system process)

:  64 (QS system process)

:  83 (QS system process)

 Pulse:  79 (QS system process)

 LaborFlag:  OB Triage (QS system process)

   

=================================================================

Datetime: 03/22/2017 20:13

=================================================================

   

 IV/Blood Work:  IV Started (Crystal White Bird, RN)

 Patient Care Comments:  18 G R hand (Crystal White Bird, RN)

   

=================================================================

Datetime: 03/22/2017 20:05

=================================================================

   

 Pain Assessment Comments:  RN at [facility] looking for new IV site 

   (Nelly Pinto RN)

 LaborFlag:  OB Triage (QS system process)

   

=================================================================

Datetime: 03/22/2017 20:00

=================================================================

   

 Monitor Mode:  External; Palpation (Nelly Pinto RN)

 Monitor Interventions for UA:  [place] Adjusted (Nelly Pinto, RN)

 Frequency (min):  UTD  (Nelly Louergrass, RN)

 Quality:  Mild (Nelly Blair, RN)

 Duration Criteria:  Less than Two 120 Second Contractions (Nelly Louergrass, RN)

 Resting Tone (Palpate):  Relaxed (Nelly Louergrass, RN)

 Monitor Mode:  External US (Nelly Pinto, RN)

 FHR Baseline Rate :  125 (Nelly Louergrass, RN)

 Variability:  Moderate 6-25 bpm (Nelly Blair, RN)

 Accelerations:  15X15 (Nelly White Bird, RN)

 Patient Position/Activity:  Left Tilt; Semi-Fowlers (Nelly Pinto, RN)

## 2017-03-24 LAB
ERYTHROCYTE [DISTWIDTH] IN BLOOD BY AUTOMATED COUNT: 14.5 % (ref 11.5–14)
HCT VFR BLD CALC: 36.2 % (ref 36–47)
HGB BLD-MCNC: 12.1 G/DL (ref 12–15.5)
HGB HCT DIFFERENCE: 0.1
MCH RBC QN AUTO: 31.2 PG (ref 27–33.4)
MCHC RBC AUTO-ENTMCNC: 33.4 G/DL (ref 32–36)
MCV RBC AUTO: 94 FL (ref 80–97)
RBC # BLD AUTO: 3.88 10^6/UL (ref 3.72–5.28)
WBC # BLD AUTO: 14.7 10^3/UL (ref 4–10.5)

## 2017-03-24 RX ADMIN — PRENATAL W/O A VIT W/ FE FUMARATE-FA CAP 106.5-1 MG SCH CAP: 106.5 CAPSULE ORAL at 09:44

## 2017-03-24 RX ADMIN — IBUPROFEN SCH MG: 800 TABLET, FILM COATED ORAL at 21:15

## 2017-03-24 RX ADMIN — ACETAMINOPHEN AND CODEINE PHOSPHATE PRN EACH: 300; 30 TABLET ORAL at 19:22

## 2017-03-24 RX ADMIN — IBUPROFEN SCH MG: 800 TABLET, FILM COATED ORAL at 06:06

## 2017-03-24 RX ADMIN — ACETAMINOPHEN AND CODEINE PHOSPHATE PRN EACH: 300; 30 TABLET ORAL at 12:44

## 2017-03-24 RX ADMIN — FERROUS SULFATE TAB 325 MG (65 MG ELEMENTAL FE) SCH MG: 325 (65 FE) TAB at 09:44

## 2017-03-24 RX ADMIN — DOCUSATE SODIUM SCH MG: 100 CAPSULE, LIQUID FILLED ORAL at 09:44

## 2017-03-24 RX ADMIN — DOCUSATE SODIUM SCH MG: 100 CAPSULE, LIQUID FILLED ORAL at 17:16

## 2017-03-24 RX ADMIN — SENNOSIDES, DOCUSATE SODIUM SCH EACH: 50; 8.6 TABLET, FILM COATED ORAL at 09:44

## 2017-03-24 RX ADMIN — IBUPROFEN SCH MG: 800 TABLET, FILM COATED ORAL at 13:31

## 2017-03-24 RX ADMIN — FERROUS SULFATE TAB 325 MG (65 MG ELEMENTAL FE) SCH MG: 325 (65 FE) TAB at 17:16

## 2017-03-24 NOTE — PDOC PROGRESS REPORT
Subjective-OB


Subjective: 


Post Delivery Day:








36 year old.  Denies any needs at this time.








Physical Exam (OB)


Vital Signs: 


 











Temp Pulse Resp BP Pulse Ox


 


 98.1 F   81   16   113/66   98 


 


 03/24/17 07:41  03/24/17 07:41  03/24/17 07:41  03/24/17 07:41  03/24/17 07:41








 Intake & Output











 03/23/17 03/24/17 03/25/17





 06:59 06:59 06:59


 


Weight 81.828 kg  














- Lochia


Lochia Amount: Scant < 10 ml


Lochia Color: Rubra/Red





- Abdomen


Description: Tender, Soft


Hernia Present: Yes


Bowel Sounds: Normoactive


Flatus Presence: Present


Stool: No


Fundal Description: Firm, Midline


Fundal Height: u/u - u/2





Objective-Diagnostic


Laboratory: 


 





 03/24/17 07:09 





 03/22/17 18:33 





 











  03/24/17





  07:09


 


WBC  14.7 H


 


RBC  3.88


 


Hgb  12.1


 


Hct  36.2


 


MCV  94


 


MCH  31.2


 


MCHC  33.4


 


RDW  14.5 H


 


Plt Count  206

## 2017-03-25 VITALS — SYSTOLIC BLOOD PRESSURE: 121 MMHG | DIASTOLIC BLOOD PRESSURE: 69 MMHG

## 2017-03-25 RX ADMIN — FERROUS SULFATE TAB 325 MG (65 MG ELEMENTAL FE) SCH MG: 325 (65 FE) TAB at 09:18

## 2017-03-25 RX ADMIN — PRENATAL W/O A VIT W/ FE FUMARATE-FA CAP 106.5-1 MG SCH CAP: 106.5 CAPSULE ORAL at 09:18

## 2017-03-25 RX ADMIN — SENNOSIDES, DOCUSATE SODIUM SCH EACH: 50; 8.6 TABLET, FILM COATED ORAL at 09:18

## 2017-03-25 RX ADMIN — IBUPROFEN SCH MG: 800 TABLET, FILM COATED ORAL at 05:24

## 2017-03-25 RX ADMIN — DOCUSATE SODIUM SCH MG: 100 CAPSULE, LIQUID FILLED ORAL at 09:18

## 2017-03-25 NOTE — PDOC DISCHARGE SUMMARY
Final Diagnosis


Discharge Date: 17





- Final Diagnosis


(1) AMA (advanced maternal age) multigravida 35+


Is this a current diagnosis for this admission?: Yes





(2) Gestational hypertension


Is this a current diagnosis for this admission?: Yes





(3) Pregnancy


Is this a current diagnosis for this admission?: Yes





(4) Vaginal delivery


Is this a current diagnosis for this admission?: Yes








Discharge Data





- Discharge Medication


Home Medications: 








Prenatal Vit/Iron Fumarate/FA [Prenatal Tablet] 1 tab PO DAILY 17 








Gestational Age: 40.4 wks


Reason(s) for Admission: Induction of Labor, PIH


Prenatal Procedures: Ultrasound


Intrapartum Procedure(s): Spontaneous Vaginal Delivery


Postpartum Complication(s): Laceration-Perineal


Laceration-Degree: 1st





-  Data


  ** Baby 1 Female


Apgar at 1 minute: 9


Apgar at 5 minutes: 9


Weight: 3.289 kg


Home with Mother: Yes


Complications: No





- Diagnosis Test


Laboratory: 


 











Temp Pulse Resp BP Pulse Ox


 


 98.2 F   77   16   121/69   97 


 


 17 07:15  17 07:15  17 19:44  17 07:15  17 07:15








 











  17





  18:33 18:39 07:09


 


RBC  4.05   3.88


 


Hgb  12.6   12.1


 


Hct  38.2   36.2


 


Urine Opiates Screen   NEGATIVE 














- Discharge information/Instructions


Discharge Activity: Activity As Tolerated, Balance Activity w/Rest, Pelvic Rest

, Slowly Increase Activity, No tub bath


Discharge Diet: Regular


Disposition: HOME, SELF-CARE


Follow up with: [facility]


in: 4, Weeks

## 2017-03-25 NOTE — PDOC PROGRESS REPORT
Subjective-OB


Subjective: 


Post Delivery Day:








36 year old.  Denies any needs at this time. Ready to go home.








Physical Exam (OB)


Vital Signs: 


 











Temp Pulse Resp BP Pulse Ox


 


 98.2 F   77   16   121/69   97 


 


 03/25/17 07:15  03/25/17 07:15  03/24/17 19:44  03/25/17 07:15  03/25/17 07:15








 Intake & Output











 03/24/17 03/25/17 03/26/17





 06:59 06:59 06:59


 


Intake Total  400 


 


Balance  400 














- Lochia


Lochia Amount: Scant < 10 ml


Lochia Color: Rubra/Red





- Abdomen


Description: Tender, Firm, Soft


Hernia Present: No


Bowel Sounds: Normoactive


Flatus Presence: Present


Stool: No


Fundal Description: Firm, Midline


Fundal Height: u/u - u/2





Objective-Diagnostic


Laboratory: 


 





 03/24/17 07:09 





 03/22/17 18:33

## 2019-01-30 ENCOUNTER — HOSPITAL ENCOUNTER (EMERGENCY)
Dept: HOSPITAL 62 - ER | Age: 39
Discharge: HOME | End: 2019-01-30
Payer: COMMERCIAL

## 2019-01-30 VITALS — SYSTOLIC BLOOD PRESSURE: 148 MMHG | DIASTOLIC BLOOD PRESSURE: 70 MMHG

## 2019-01-30 DIAGNOSIS — J06.9: ICD-10-CM

## 2019-01-30 DIAGNOSIS — R21: ICD-10-CM
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
## 2019-01-30 DIAGNOSIS — R05: ICD-10-CM

## 2019-01-30 DIAGNOSIS — R52: ICD-10-CM

## 2019-01-30 DIAGNOSIS — H10.9: ICD-10-CM

## 2019-01-30 DIAGNOSIS — J34.89: ICD-10-CM

## 2019-01-30 DIAGNOSIS — L29.8: ICD-10-CM

## 2019-01-30 DIAGNOSIS — R50.9: ICD-10-CM

## 2019-01-30 DIAGNOSIS — T78.40XA: Primary | ICD-10-CM

## 2019-01-30 LAB
A TYPE INFLUENZA AG: NEGATIVE
B INFLUENZA AG: NEGATIVE

## 2019-01-30 PROCEDURE — 87804 INFLUENZA ASSAY W/OPTIC: CPT

## 2019-01-30 PROCEDURE — 99283 EMERGENCY DEPT VISIT LOW MDM: CPT

## 2019-01-30 NOTE — ER DOCUMENT REPORT
HPI





- HPI


Time Seen by Provider: 01/30/19 12:00


Pain Level: 3


Notes: 








Patient is a 38-year-old female who presents with multiple complaints today.  

Patient reports she has had a rash under her right breast for approximately 10 

days.  States she saw her primary care provider for this and was given a steroid

cream.  States this has not helped her symptoms.  She describes that the rash is

itchy, denies any pain to the area.  Denies the use of any new products.  Ángel martini also complains of cough, congestion and body aches that have been going on for

3-5 days.  She states this morning she also woke up with discharge and 

irritation to her right eye.  She denies use of any contact lenses and denies 

any visual changes but reports the eye feels itchy and was crusted shut when she

woke up.








- RESPIRATORY


Respiratory: REPORTS: Coughing





- REPRODUCTIVE


Reproductive: DENIES: Pregnant:





Past Medical History





- General


Information source: Patient





- Social History


Smoking Status: Never Smoker


Chew tobacco use (# tins/day): No


Frequency of alcohol use: None


Drug Abuse: None


Family History: Reviewed & Not Pertinent


Patient has suicidal ideation: No


Patient has homicidal ideation: No





- Medical History


Medical History: Negative


Renal/ Medical History: Denies: Hx Peritoneal Dialysis


Surgical Hx: Negative





- Immunizations


Immunizations up to date: Yes





Vertical Provider Document





- CONSTITUTIONAL


Notes: 





PHYSICAL EXAMINATION:





GENERAL: Well-appearing, well-nourished and in no acute distress.





HEAD: Atraumatic, normocephalic.





EYES: Pupils equal round extraocular movements intact,  conjunctiva mildly 

erythematous to the right side with yellowish drainage.





ENT: Nares patent with clear rhinorrhea noted.  Tonsils without erythema, 

exudates or swelling.





NECK: Normal range of motion, no cervical lymphadenopathy.





LUNGS: No respiratory distress, lung sounds clear to auscultation bilaterally.





Musculoskeletal: Normal range of motion





NEUROLOGICAL:  Normal speech, normal gait. 





PSYCH: Normal mood, normal affect.





SKIN: Warm, Dry, normal turgor, faint erythematous rash noted under right breast

extending to the mid axillary line.





- INFECTION CONTROL


TRAVEL OUTSIDE OF THE [place] IN LAST 30 DAYS: No





Course





- Re-evaluation


Re-evalutation: 





Rash under patient's right breast appears to be consistent with an allergic 

reaction as the rash appears to be mild hives.  There is no pain to this rash 

and it does not appear to be consistent with shingles as the patient initially 

thought.  Will place patient on Pepcid, prednisone and Benadryl for this.  

Examination of right eye consistent with conjunctivitis, will place patient on 

Polytrim.  Patient was tested for influenza as patient does report fever and 

body aches however this testing has come up negative.  Patient given information

regarding over-the-counter medications to help with some of her symptoms.  

Patient verbalizes understanding and agreement with plan.





- Vital Signs


Vital signs: 


                                        











Temp Pulse Resp BP Pulse Ox


 


 98.4 F   76   16   143/89 H  99 


 


 01/30/19 11:28  01/30/19 11:28  01/30/19 11:28  01/30/19 11:28  01/30/19 11:28














Discharge





- Discharge


Clinical Impression: 


Conjunctivitis


Qualifiers:


 Conjunctivitis type: unspecified Laterality: right Qualified Code(s): H10.9 - 

Unspecified conjunctivitis





Allergic reaction


Qualifiers:


 Encounter type: initial encounter Qualified Code(s): T78.40XA - Allergy, 

unspecified, initial encounter





URI (upper respiratory infection)


Qualifiers:


 URI type: unspecified viral URI Qualified Code(s): J06.9 - Acute upper 

respiratory infection, unspecified





Condition: Stable


Disposition: HOME, SELF-CARE


Instructions:  Upper Respiratory Illness ([facility])


Additional Instructions: 


Please apply the eyedrops to the affected eye.  You may use 1 drop every 3 hours

not to exceed 6 doses per day.  Take the other medications as prescribed for the

rash on your chest.  Try not to itch if possible.  You may also take Benadryl 

25-50 mg every 6 hours as needed for itching.  Follow-up with your primary care 

provider if rash and conjunctivitis not resolving over the next 3-5 days.


Prescriptions: 


Famotidine [Pepcid 40 mg Tablet] 40 mg PO BID #14 tablet


Prednisone [Deltasone 20 mg Tablet] 3 tab PO DAILY 5 Days #15 tablet


Referrals: 


CASANDRA LOPEZ MD [Primary Care Provider] - Follow up as needed

## 2019-03-06 ENCOUNTER — HOSPITAL ENCOUNTER (EMERGENCY)
Dept: HOSPITAL 62 - ER | Age: 39
LOS: 1 days | Discharge: HOME | End: 2019-03-07
Payer: COMMERCIAL

## 2019-03-06 DIAGNOSIS — M25.511: Primary | ICD-10-CM

## 2019-03-06 DIAGNOSIS — M54.2: ICD-10-CM

## 2019-03-06 DIAGNOSIS — R07.89: ICD-10-CM

## 2019-03-06 LAB
ADD MANUAL DIFF: NO
ALBUMIN SERPL-MCNC: 4.3 G/DL (ref 3.5–5)
ALP SERPL-CCNC: 56 U/L (ref 38–126)
ALT SERPL-CCNC: 16 U/L (ref 9–52)
ANION GAP SERPL CALC-SCNC: 11 MMOL/L (ref 5–19)
APPEARANCE UR: (no result)
APTT PPP: YELLOW S
AST SERPL-CCNC: 20 U/L (ref 14–36)
BASOPHILS # BLD AUTO: 0.1 10^3/UL (ref 0–0.2)
BASOPHILS NFR BLD AUTO: 0.9 % (ref 0–2)
BILIRUB DIRECT SERPL-MCNC: 0.2 MG/DL (ref 0–0.4)
BILIRUB SERPL-MCNC: 0.2 MG/DL (ref 0.2–1.3)
BILIRUB UR QL STRIP: NEGATIVE
BUN SERPL-MCNC: 16 MG/DL (ref 7–20)
CALCIUM: 10 MG/DL (ref 8.4–10.2)
CHLORIDE SERPL-SCNC: 102 MMOL/L (ref 98–107)
CK MB SERPL-MCNC: 0.22 NG/ML (ref ?–4.55)
CK SERPL-CCNC: 49 U/L (ref 30–135)
CO2 SERPL-SCNC: 28 MMOL/L (ref 22–30)
EOSINOPHIL # BLD AUTO: 0 10^3/UL (ref 0–0.6)
EOSINOPHIL NFR BLD AUTO: 0.7 % (ref 0–6)
ERYTHROCYTE [DISTWIDTH] IN BLOOD BY AUTOMATED COUNT: 13.8 % (ref 11.5–14)
GLUCOSE SERPL-MCNC: 89 MG/DL (ref 75–110)
GLUCOSE UR STRIP-MCNC: NEGATIVE MG/DL
HCT VFR BLD CALC: 40.2 % (ref 36–47)
HGB BLD-MCNC: 13.4 G/DL (ref 12–15.5)
KETONES UR STRIP-MCNC: NEGATIVE MG/DL
LYMPHOCYTES # BLD AUTO: 1.8 10^3/UL (ref 0.5–4.7)
LYMPHOCYTES NFR BLD AUTO: 27.4 % (ref 13–45)
MCH RBC QN AUTO: 31 PG (ref 27–33.4)
MCHC RBC AUTO-ENTMCNC: 33.4 G/DL (ref 32–36)
MCV RBC AUTO: 93 FL (ref 80–97)
MONOCYTES # BLD AUTO: 0.7 10^3/UL (ref 0.1–1.4)
MONOCYTES NFR BLD AUTO: 10.1 % (ref 3–13)
NEUTROPHILS # BLD AUTO: 4 10^3/UL (ref 1.7–8.2)
NEUTS SEG NFR BLD AUTO: 60.9 % (ref 42–78)
NITRITE UR QL STRIP: NEGATIVE
PH UR STRIP: 5 [PH] (ref 5–9)
PLATELET # BLD: 356 10^3/UL (ref 150–450)
POTASSIUM SERPL-SCNC: 3.7 MMOL/L (ref 3.6–5)
PROT SERPL-MCNC: 7.2 G/DL (ref 6.3–8.2)
PROT UR STRIP-MCNC: NEGATIVE MG/DL
RBC # BLD AUTO: 4.33 10^6/UL (ref 3.72–5.28)
SODIUM SERPL-SCNC: 140.7 MMOL/L (ref 137–145)
SP GR UR STRIP: 1.02
TOTAL CELLS COUNTED % (AUTO): 100 %
TROPONIN I SERPL-MCNC: < 0.012 NG/ML
UROBILINOGEN UR-MCNC: 2 MG/DL (ref ?–2)
WBC # BLD AUTO: 6.5 10^3/UL (ref 4–10.5)

## 2019-03-06 PROCEDURE — 82553 CREATINE MB FRACTION: CPT

## 2019-03-06 PROCEDURE — 93005 ELECTROCARDIOGRAM TRACING: CPT

## 2019-03-06 PROCEDURE — 85379 FIBRIN DEGRADATION QUANT: CPT

## 2019-03-06 PROCEDURE — 99284 EMERGENCY DEPT VISIT MOD MDM: CPT

## 2019-03-06 PROCEDURE — 80053 COMPREHEN METABOLIC PANEL: CPT

## 2019-03-06 PROCEDURE — 81025 URINE PREGNANCY TEST: CPT

## 2019-03-06 PROCEDURE — 96374 THER/PROPH/DIAG INJ IV PUSH: CPT

## 2019-03-06 PROCEDURE — 93010 ELECTROCARDIOGRAM REPORT: CPT

## 2019-03-06 PROCEDURE — 82550 ASSAY OF CK (CPK): CPT

## 2019-03-06 PROCEDURE — 71275 CT ANGIOGRAPHY CHEST: CPT

## 2019-03-06 PROCEDURE — 81001 URINALYSIS AUTO W/SCOPE: CPT

## 2019-03-06 PROCEDURE — 84484 ASSAY OF TROPONIN QUANT: CPT

## 2019-03-06 PROCEDURE — 85025 COMPLETE CBC W/AUTO DIFF WBC: CPT

## 2019-03-06 PROCEDURE — 36415 COLL VENOUS BLD VENIPUNCTURE: CPT

## 2019-03-06 NOTE — ER DOCUMENT REPORT
ED General





- General


Chief Complaint: Shortness Of Breath


Stated Complaint: DIFFICULTY BREATHING


Time Seen by Provider: 03/06/19 22:42


Primary Care Provider: 


CASANDRA LOPEZ MD [Primary Care Provider] - Follow up as needed


Mode of Arrival: Ambulatory


Information source: Patient


Notes: 





HISTORY OF PRESENT ILLNESS:





Patient is a 38-year-old female with a past medical history of migraines who 

presents with 1 day of right shoulder pain that radiates to the right side of 

the chest and into the neck.





Location: Right shoulder/chest/neck


Onset: Today


Alleviation: None


Provocation: Movement of the head or arm


Quality: Sharp


Radiation: None


Severity: Currently mild, moderate at worst


Timing: Intermittent


History of CAD: None


Associated symptoms: No fevers or chills, no cough or congestion, no shortness 

of breath, no swelling of the extremities


Risk factors: Patient reports having a trip to [place] 3 weeks ago











REVIEW OF SYSTEMS:





CONSTITUTIONAL : Denies fever or chills, no sweats.  Denies recent illness.


EENT:   Denies eye, ear, throat, or mouth pain or symptoms.  Denies nasal or 

sinus congestion.


CARDIOVASCULAR: Positive for chest pain.  Denies swelling of the legs.


RESPIRATORY: Denies cough, cold, or chest congestion.  Denies shortness of 

breath or difficulty breathing.  Denies wheezing.


GASTROINTESTINAL: Denies abdominal pain.  Denies nausea, vomiting, or diarrhea. 

Denies constipation. 


GENITOURINARY:  Denies difficulty urinating, painful urination, burning, 

frequency, or blood in urine.


FEMALE  GENITOURINARY:  Denies vaginal bleeding, abnormal or irregular periods.


MUSCULOSKELETAL: Positive for right shoulder pain.  


SKIN:   Denies rash or skin lesions.


HEMATOLOGIC :   Denies easy bruising or bleeding.


LYMPHATIC:  Denies swollen, enlarged glands.


NEUROLOGICAL:  Denies altered mental status or loss of consciousness.  Denies 

headache.  Denies weakness or paralysis or loss of use of either side.  Denies 

problems with gait or speech.  Denies sensory or motor loss.


PSYCHIATRIC:  Denies anxiety or stress or depression.





All other systems reviewed and negative.











PHYSICAL EXAMINATION:





GENERAL: Well-appearing, well-nourished and in no acute distress.


HEAD: Atraumatic, normocephalic. No scalp deformity, depression, or crepitance.


EYES: Pupils are 3 mm and equal/round/reactive to light, extraocular movements 

intact, sclera anicteric, conjunctiva are normal.


ENT: Nares patent bilaterally, oropharynx.  Moist mucous membranes. No tonsil hy

pertrophy.


NECK: Normal range of motion, supple without lymphadenopathy.


LUNGS: Breath sounds present, equal, and clear to auscultation bilaterally.  No 

wheezes, rales, or rhonchi.


HEART: Regular rate and rhythm without murmurs, rubs, or gallops.  2+ peripheral

pulses.  Normal capillary refill.


ABDOMEN: Soft, nontender, nondistended. Normoactive bowel sounds.  No guarding, 

no rebound.  No masses appreciated.


BACK: Normal contour, no midline tenderness. Rectal exam deferred.


GENITAL/PELVIC: Deferred.


EXTREMITIES: Mild pain with range of motion to the right upper extremity, no 

deformity, no crepitance normal range of motion, no pitting or edema.  No 

cyanosis.


NEUROLOGICAL: No focal neurological deficits. Moves all extremities 

spontaneously and on command.


PSYCH: Normal mood, normal affect.  No suicidal thoughts/ideations.  No 

homocidal thoughts/ideations.  No hallucinations.


SKIN: Warm, dry, normal turgor, no rashes or lesions noted.











ASSESSMENT AND PLAN:





This patient is a 38-year-old female who presents with right shoulder pain that 

also includes the right chest wall and neck.  Preliminary blood work shows 

elevated d-dimer, otherwise blood work is unremarkable.





1. Will obtain CT angiogram of the chest.


2. Will give IV morphine and reassess.


TRAVEL OUTSIDE OF THE [place] IN LAST 30 DAYS: No





- Related Data


Allergies/Adverse Reactions: 


                                        





No Known Allergies Allergy (Verified 03/22/17 19:26)


   











Past Medical History





- General


Information source: Patient





- Social History


Smoking Status: Never Smoker


Chew tobacco use (# tins/day): No


Frequency of alcohol use: None


Drug Abuse: None


Lives with: Family


Family History: Reviewed & Not Pertinent


Patient has suicidal ideation: No


Patient has homicidal ideation: No





- Medical History


Medical History: Negative





- Past Medical History


Cardiac Medical History: Reports: None


Pulmonary Medical History: Reports: None


EENT Medical History: Reports: None


Neurological Medical History: Reports: Hx Migraine


Endocrine Medical History: Reports: None


Renal/ Medical History: Reports: None.  Denies: Hx Peritoneal Dialysis


Malignancy Medical History: Reports: None


GI Medical History: Reports: None


Musculoskeletal Medical History: Reports None


Skin Medical History: Reports None


Psychiatric Medical History: Reports: None


Traumatic Medical History: Reports: None


Infectious Medical History: Reports: None


Surgical Hx: Negative


Past Surgical History: Reports: None





- Immunizations


Immunizations up to date: Yes


Hx Diphtheria, Pertussis, Tetanus Vaccination: Yes


History of Influenza Vaccine for 10/2017 - 3/2018 Season: Unknown





Physical Exam





- Vital signs


Vitals: 


                                        











Temp Pulse Resp BP Pulse Ox


 


 99.2 F   82   14   139/91 H  99 


 


 03/06/19 20:32  03/06/19 20:32  03/06/19 20:32  03/06/19 20:32  03/06/19 20:32














Course





- Re-evaluation


Re-evalutation: 





03/07/19 02:48


CT scan is negative for acute pulmonary embolism.  Patient will be discharged 

home with return precautions and follow-up as needed.  Patient voices both 

understanding and agreeing with the plan.





- Vital Signs


Vital signs: 


                                        











Temp Pulse Resp BP Pulse Ox


 


 97.9 F   78   17   134/91 H  96 


 


 03/07/19 03:00  03/07/19 03:00  03/07/19 03:00  03/07/19 03:00  03/07/19 03:00














- Laboratory


Result Diagrams: 


                                 03/06/19 21:30





                                 03/06/19 21:30


Laboratory results interpreted by me: 


                                        











  03/06/19 03/06/19





  21:30 21:30


 


D-Dimer  0.67 H 


 


Urine Blood   LARGE H


 


Urine Urobilinogen   2.0 H


 


Ur Leukocyte Esterase   TRACE H


 


Urine Ascorbic Acid   40 H














- Diagnostic Test


Radiology reviewed: Image reviewed, Reports reviewed





- EKG Interpretation by Me


EKG shows normal: Sinus rhythm


Rate: Normal


Rhythm: NSR


Axis/QRS: No: Right axis deviation, Left axis deviation, RBBB, LBBB, IVCD, 

LAHB/LAFB, LPHB/LPFB, Bifasicular block


Voltage: No: Increased voltage, Consistant with LVH, Decreased voltage, 

Throughout, Limb leads


P Waves: No: KENNETH, LAE, Absent, AV Dissociation, Other


Heart block present: No: 1st Degree, Mobitz 1, Mobitz 2, CHB (3rd degree block)


When compared to previous EKG there are: Previous EKG unavailable





Discharge





- Discharge


Clinical Impression: 


Shoulder pain, right


Qualifiers:


 Chronicity: acute Qualified Code(s): M25.511 - Pain in right shoulder





Condition: Good


Disposition: HOME, SELF-CARE


Instructions:  Muscle Strain ([facility])


Additional Instructions: 


You have been evaluated in the Emergency Department for shoulder and chest pain.

 While here, you had blood work that was normal along with a CAT scan of your 

chest that also was normal and it is now safe to be discharged home.  Please 

follow-up with your primary physician as instructed in in 1 week if symptoms 

persist. Return to the Emergency Department if you experience chest pain, 

difficulty breathing, or any other concerning symptoms.


Prescriptions: 


Diclofenac Sodium 75 mg PO BID #30 tablet.dr


Forms:  Return to Work


Referrals: 


CASANDRA LOPEZ MD [Primary Care Provider] - Follow up as needed


Print Language: English

## 2019-03-07 VITALS — DIASTOLIC BLOOD PRESSURE: 91 MMHG | SYSTOLIC BLOOD PRESSURE: 134 MMHG

## 2019-03-07 NOTE — RADIOLOGY REPORT (SQ)
EXAM DESCRIPTION: 



CT CHEST ANGIOGRAPHY WITHOUT THEN WITH IV CONTRAST



COMPLETED DATE/TME:  03/07/2019 00:38



CLINICAL HISTORY: 



38 years, Female, Chest pain



COMPARISON:

None.



TECHNIQUE:

581  Images stored on PACS.

 

All CT scanners at this facility use dose modulation, iterative

reconstruction, and/or weight based dosing when appropriate to

reduce radiation dose to as low as reasonably achievable (ALARA).

Axial CTA images were obtained with coronal and sagittal MIPS

reconstructions 



[facility]: Dose Right [facility]: CareDose   [facility]: Dose Right    [facility]:

Teradose 4D    [facility]: [employer]



LIMITATIONS:

None.



FINDINGS:



The mediastinal vasculature enhances normally. There is no

intraluminal filling defect to suggest pulmonary embolus.

Negative for thoracic aortic aneurysm or dissection. The heart

and pericardium are unremarkable. No mediastinal or hilar

adenopathy. Limited evaluation of the upper abdomen is

unremarkable. Osseous structures are grossly intact. No

pneumothorax. Visualized airways are patent. Lungs are clear





IMPRESSION:



Unremarkable CTA chest

 

TECHNICAL DOCUMENTATION:



Quality ID # [id number]: Final reports with documentation of one or more

dose reduction techniques (e.g., Automated exposure control,

adjustment of the mA and/or kV according to patient size, use of

iterative reconstruction technique)



copyright 2011 [employer]- All Rights Reserved
detailed exam

## 2019-07-17 LAB
ADD MANUAL MICROSCOPIC: YES
APPEARANCE UR: (no result)
APTT PPP: YELLOW S
BACTERIA #/AREA URNS HPF: (no result) /HPF
BILIRUB UR QL STRIP: NEGATIVE
ERYTHROCYTE [DISTWIDTH] IN BLOOD BY AUTOMATED COUNT: 12.9 % (ref 11.5–14)
GLUCOSE UR STRIP-MCNC: NEGATIVE MG/DL
HCT VFR BLD CALC: 44.3 % (ref 36–47)
HGB BLD-MCNC: 14.7 G/DL (ref 12–15.5)
KETONES UR STRIP-MCNC: NEGATIVE MG/DL
MCH RBC QN AUTO: 30.9 PG (ref 27–33.4)
MCHC RBC AUTO-ENTMCNC: 33.2 G/DL (ref 32–36)
MCV RBC AUTO: 93 FL (ref 80–97)
NITRITE UR QL STRIP: NEGATIVE
PH UR STRIP: 8 [PH] (ref 5–9)
PLATELET # BLD: 466 10^3/UL (ref 150–450)
PROT UR STRIP-MCNC: NEGATIVE MG/DL
RBC # BLD AUTO: 4.76 10^6/UL (ref 3.72–5.28)
SP GR UR STRIP: 1.02
UROBILINOGEN UR-MCNC: NEGATIVE MG/DL (ref ?–2)
WBC # BLD AUTO: 8.9 10^3/UL (ref 4–10.5)
WBC #/AREA URNS HPF: (no result) /HPF

## 2019-07-18 ENCOUNTER — HOSPITAL ENCOUNTER (OUTPATIENT)
Dept: HOSPITAL 62 - OROUT | Age: 39
Discharge: HOME | End: 2019-07-18
Attending: OBSTETRICS & GYNECOLOGY
Payer: COMMERCIAL

## 2019-07-18 VITALS — DIASTOLIC BLOOD PRESSURE: 90 MMHG | SYSTOLIC BLOOD PRESSURE: 136 MMHG

## 2019-07-18 DIAGNOSIS — Z30.2: Primary | ICD-10-CM

## 2019-07-18 DIAGNOSIS — D64.9: ICD-10-CM

## 2019-07-18 PROCEDURE — 81025 URINE PREGNANCY TEST: CPT

## 2019-07-18 PROCEDURE — 58670 LAPAROSCOPY TUBAL CAUTERY: CPT

## 2019-07-18 PROCEDURE — 85027 COMPLETE CBC AUTOMATED: CPT

## 2019-07-18 PROCEDURE — 81001 URINALYSIS AUTO W/SCOPE: CPT

## 2019-07-18 PROCEDURE — 36415 COLL VENOUS BLD VENIPUNCTURE: CPT

## 2019-07-18 PROCEDURE — 00851 ANES IPER PX TUBAL LIGATION: CPT

## 2019-07-18 NOTE — OPERATIVE REPORT E
Operative Report



NAME: NICOLE IRBY

MRN:  [id number]          : 1980 AGE:  39Y

DATE OF SURGERY: 2019               ROOM:



PREOPERATIVE DIAGNOSIS:

UNDESIRED FERTILITY.



POSTOPERATIVE DIAGNOSIS:

UNDESIRED FERTILITY.



OPERATION:

Laparoscopic tubal cauterization.



SURGEON:

QUETA CM M.D.



ANESTHESIA:

Maykel Santos M.D.



FINDINGS:

A normal uterus, tubes, and ovaries.



COMPLICATIONS:

None.



ESTIMATED BLOOD LOSS:

10 mL.



SPECIMENS REMOVED:

None.



PROCEDURE IN DETAIL:

The patient was taken to the operating room, prepared and draped in the

normal sterile fashion in the dorsal lithotomy position.  Under sterile

conditions the in and out cath was performed with approximately 30 mL of

clear urine.  Sterile speculum was placed in the vagina and the cervix was

prepped with Betadine and grasped on the anterior aspect with a single

tooth tenaculum and a Hulka clamp was placed in the cervix for uterine

manipulation without difficulty.  The tenaculum and the speculum were then

removed.  Gloves were changed and attention was turned to the upper

portion of the case, where an umbilical skin incision was made to

accommodate a 5 mm port, and the Veress needle was introduced through this

incision.  Peritoneal cavity placement was confirmed with free flow of

sterile water and introductory pressure of less than 7 mmHg.  The abdomen

was then inflated with approximately 2 L of CO2 gas.  The Veress needle

was removed and the 5 mm port was placed through this incision, and the

patient was placed in Trendelenburg with the above findings noted.  Under

direct visualization, another 5 mm port was placed in the left lower

quadrant, and through this incision, the atraumatic grasper was used to

sweep away the bowel from the surgical area.  The grasper was removed and

the Kleppinger was then introduced, and starting with the right fallopian

tube, the fallopian tube was coagulated approximately 4 cm to ensure

complete occlusion.  This was repeated on the left fallopian tube without

difficulty.  The case was concluded and the fallopian tubes were inspected

and were felt to be completely occluded with cautery, and the instruments

were then removed without difficulty.  The umbilical port was left in

place to deflate the abdomen, and once the abdomen was deflated, this port

was removed and the skin was closed at both sites using 4-0 Vicryl.  The

patient tolerated the procedure well.  Sponge, lap, and needle counts

correct x2.  Hulka clamp was removed at the end of the case.



DICTATING PHYSICIAN:  QUETA CM M.D.





[id number]                  DT: 2019    1528

PHY#: [id number]            DD: 2019    1514

ID:   [id number]           JOB#: [id number]       ACCT: [id number]



cc:QUETA CM M.D.

>

## 2020-12-08 LAB
ALBUMIN SERPL-MCNC: 5 G/DL (ref 3.5–5)
ALP SERPL-CCNC: 59 U/L (ref 38–126)
ANION GAP SERPL CALC-SCNC: 12 MMOL/L (ref 5–19)
APPEARANCE UR: CLEAR
APTT PPP: (no result) S
AST SERPL-CCNC: 31 U/L (ref 14–36)
BILIRUB DIRECT SERPL-MCNC: 0.2 MG/DL (ref 0–0.4)
BILIRUB SERPL-MCNC: 0.7 MG/DL (ref 0.2–1.3)
BILIRUB UR QL STRIP: NEGATIVE
BUN SERPL-MCNC: 9 MG/DL (ref 7–20)
CALCIUM: 10.9 MG/DL (ref 8.4–10.2)
CHLORIDE SERPL-SCNC: 100 MMOL/L (ref 98–107)
CO2 SERPL-SCNC: 26 MMOL/L (ref 22–30)
ERYTHROCYTE [DISTWIDTH] IN BLOOD BY AUTOMATED COUNT: 14.3 % (ref 11.5–14)
GLUCOSE SERPL-MCNC: 85 MG/DL (ref 75–110)
GLUCOSE UR STRIP-MCNC: NEGATIVE MG/DL
HCT VFR BLD CALC: 42.2 % (ref 36–47)
HGB BLD-MCNC: 14 G/DL (ref 12–15.5)
KETONES UR STRIP-MCNC: 20 MG/DL
MCH RBC QN AUTO: 30.5 PG (ref 27–33.4)
MCHC RBC AUTO-ENTMCNC: 33.1 G/DL (ref 32–36)
MCV RBC AUTO: 92 FL (ref 80–97)
NITRITE UR QL STRIP: NEGATIVE
PH UR STRIP: 6 [PH] (ref 5–9)
PLATELET # BLD: 427 10^3/UL (ref 150–450)
POTASSIUM SERPL-SCNC: 5 MMOL/L (ref 3.6–5)
PROT SERPL-MCNC: 8.3 G/DL (ref 6.3–8.2)
PROT UR STRIP-MCNC: NEGATIVE MG/DL
RBC # BLD AUTO: 4.57 10^6/UL (ref 3.72–5.28)
SP GR UR STRIP: 1.01
UROBILINOGEN UR-MCNC: NEGATIVE MG/DL (ref ?–2)
WBC # BLD AUTO: 8.3 10^3/UL (ref 4–10.5)

## 2020-12-11 ENCOUNTER — HOSPITAL ENCOUNTER (OUTPATIENT)
Dept: HOSPITAL 62 - OROUT | Age: 40
LOS: 1 days | Discharge: HOME | End: 2020-12-12
Attending: OBSTETRICS & GYNECOLOGY
Payer: COMMERCIAL

## 2020-12-11 DIAGNOSIS — N92.1: ICD-10-CM

## 2020-12-11 DIAGNOSIS — Z01.812: ICD-10-CM

## 2020-12-11 DIAGNOSIS — D25.1: ICD-10-CM

## 2020-12-11 DIAGNOSIS — Z20.828: ICD-10-CM

## 2020-12-11 DIAGNOSIS — N81.2: ICD-10-CM

## 2020-12-11 DIAGNOSIS — N39.490: ICD-10-CM

## 2020-12-11 DIAGNOSIS — Z79.899: ICD-10-CM

## 2020-12-11 DIAGNOSIS — N39.3: ICD-10-CM

## 2020-12-11 DIAGNOSIS — N93.9: Primary | ICD-10-CM

## 2020-12-11 DIAGNOSIS — I10: ICD-10-CM

## 2020-12-11 DIAGNOSIS — N87.9: ICD-10-CM

## 2020-12-11 PROCEDURE — 86900 BLOOD TYPING SEROLOGIC ABO: CPT

## 2020-12-11 PROCEDURE — 94799 UNLISTED PULMONARY SVC/PX: CPT

## 2020-12-11 PROCEDURE — 81001 URINALYSIS AUTO W/SCOPE: CPT

## 2020-12-11 PROCEDURE — 57288 REPAIR BLADDER DEFECT: CPT

## 2020-12-11 PROCEDURE — C9803 HOPD COVID-19 SPEC COLLECT: HCPCS

## 2020-12-11 PROCEDURE — 88307 TISSUE EXAM BY PATHOLOGIST: CPT

## 2020-12-11 PROCEDURE — 85027 COMPLETE CBC AUTOMATED: CPT

## 2020-12-11 PROCEDURE — 36415 COLL VENOUS BLD VENIPUNCTURE: CPT

## 2020-12-11 PROCEDURE — C1758 CATHETER, URETERAL: HCPCS

## 2020-12-11 PROCEDURE — 80053 COMPREHEN METABOLIC PANEL: CPT

## 2020-12-11 PROCEDURE — 58260 VAGINAL HYSTERECTOMY: CPT

## 2020-12-11 PROCEDURE — C1781 MESH (IMPLANTABLE): HCPCS

## 2020-12-11 PROCEDURE — 81025 URINE PREGNANCY TEST: CPT

## 2020-12-11 PROCEDURE — 57200 REPAIR OF VAGINA: CPT

## 2020-12-11 PROCEDURE — 86850 RBC ANTIBODY SCREEN: CPT

## 2020-12-11 PROCEDURE — 86901 BLOOD TYPING SEROLOGIC RH(D): CPT

## 2020-12-11 PROCEDURE — 87635 SARS-COV-2 COVID-19 AMP PRB: CPT

## 2020-12-11 PROCEDURE — 00944 ANES VAG PX VAG HYSTERECTOMY: CPT

## 2020-12-11 RX ADMIN — KETOROLAC TROMETHAMINE SCH MG: 30 INJECTION, SOLUTION INTRAMUSCULAR at 17:47

## 2020-12-11 RX ADMIN — DOCUSATE SODIUM SCH: 100 CAPSULE, LIQUID FILLED ORAL at 11:34

## 2020-12-11 RX ADMIN — DOCUSATE SODIUM SCH MG: 100 CAPSULE, LIQUID FILLED ORAL at 17:49

## 2020-12-11 NOTE — OPERATIVE REPORT
Operative Report


DATE OF SURGERY: 12/11/20


PREOPERATIVE DIAGNOSIS: Abnormal uterine bleeding, uterovaginal prolapse, stress

urinary incontinence,


POSTOPERATIVE DIAGNOSIS: Same


OPERATION: Transvaginal hysterectomy with uterosacral suspension, anterior 

repair, Stephanie sling placement


SURGEON: QUETA CM


1ST ASSISTANT: KARISHMA WATTERS


2ND Assistant: HANNA SANTIAGO


ANESTHESIA: GA


TISSUE REMOVED OR ALTERED: Uterus and cervix


COMPLICATIONS: 





None


ESTIMATED BLOOD LOSS: 100 cc


INTRAOPERATIVE FINDINGS: Approximately 8-week size uterus, grade 2 anterior 

prolapse, posterior prolapse resolved once hysterectomy performed


PROCEDURE: 





Patient was taken to the operating room prepared and draped in normal sterile 

fashion a dorsal lithotomy position in [facility].  Placed in the 

posterior fourchette retractor was placed in the anterior fourchette.  Cervix 

was grasped with a triple tooth tenaculum and injected circumferentially with 10

cc of lidocaine with epi.  The cervix was then scored with a 10 blade ventral 

fashion so was dissected away from the uterus using Kumari's.  The anterior 

cul-de-sac was entered sharply with the Mayos and the weighted speculum was 

replaced with a long weighted speculum.  Anterior cul-de-sac was then also 

entered sharply.  The uterosacral ligaments were then clamped and cut with 

Arlyn clamps and Kumari scissors these pedicles were tied off with 2-0 Vicryl 

tagged with hemostats.  The rest of the uterine artery was then ligated on both 

sides using the LigaSure.  The uterus was flipped once we reached the fundus of 

the mucosa was ligated using the LigaSure specimen was completely freed.  We 

swept the bowel back with sponge sticks were the fallopian tubes were not 

visible through the epiploica.  Her attempts to remove the fallopian tubes were 

abandoned in regards to patient safety.  The angle of the vaginal cuff was then 

with pickups and the vaginal cuff was closed with an 0 Vicryl runner.    

Begining the anterior vaginal mucosa was grasped with Allises clamps in a 

vertical fashion this mucosa was then and injected with approximately 10-1/2 cc 

of lidocaine with epi the mucosa was then scored with a 15 blade in the midline 

the mucosa was dissected away from the vesicovaginal junction using Metzenbaums 

and blunt dissection and sharp dissection as needed.  Once the sacral bone was 

palpated and the uterosacral ligament was located via palpation the anchor 

anchor sure device was deployed in the anchor sure was fixed to the apex of the 

anterior repair tear repair was then completed with 2 bridge sutures of 2-0 

Vicryl placed in the vesicovaginal peritoneum. Excess vaginal mucosa was then tr

immed.  Carmichael stitch was tied down.  The defect from the anterior repair was 

then sutured closed with an 2-0 Vicryl runner.  Attention was then turned to 

placement of the Stephanie sling and once again the anterior mucosa approximately 1 cm

below the urethra was injected with 5 cc of lidocaine with epi.  This mucosa was

then scored 15 blade.  The mucosa was dissected away from the urethral junction 

using Metzenbaums and blunt dissection as needed to a depth of where the pubic 

bone could be palpated at the apex both sides Shmuel marks were then made on 

either side of the midline of the pubic bone.  Through these puncture perez the 

Stephanie sling introducers were placed hugging the pubic bone closely and extending 

the instruments through the vaginal mucosa at the deep placement of the Deflux 

fact.  The Saleem catheter was then removed and a cystoscopy was performed.  With

no evidence of puncture into the bladder the instruments could be seen on the 

with movement through the mucosa of the bladder.  The Stephanie sling was then placed 

onto the Stephanie sling introducers and the Stephanie sling was pulled through the defect 

placed a Arlyn clamp was placed behind the Stephanie sling to provide adequate space 

between the urethra and the sling .  The sling was then trimmed at the just 

below the skin level and the defect at the vagina was then with 2-0 Vicryl .  

The Saleem catheter was replaced . the vagina was then inspected Kerlix packing 

with Premarin cream on the end was placed within the vagina.  Was taken to 

recovery in stable condition sponge lap and needle counts were correct x2

## 2020-12-12 VITALS — DIASTOLIC BLOOD PRESSURE: 62 MMHG | SYSTOLIC BLOOD PRESSURE: 117 MMHG

## 2020-12-12 LAB
ERYTHROCYTE [DISTWIDTH] IN BLOOD BY AUTOMATED COUNT: 14.6 % (ref 11.5–14)
HCT VFR BLD CALC: 34.6 % (ref 36–47)
HGB BLD-MCNC: 11.5 G/DL (ref 12–15.5)
MCH RBC QN AUTO: 30.7 PG (ref 27–33.4)
MCHC RBC AUTO-ENTMCNC: 33.2 G/DL (ref 32–36)
MCV RBC AUTO: 93 FL (ref 80–97)
PLATELET # BLD: 282 10^3/UL (ref 150–450)
RBC # BLD AUTO: 3.75 10^6/UL (ref 3.72–5.28)
WBC # BLD AUTO: 8.3 10^3/UL (ref 4–10.5)

## 2020-12-12 RX ADMIN — KETOROLAC TROMETHAMINE SCH: 30 INJECTION, SOLUTION INTRAMUSCULAR at 09:09

## 2020-12-12 RX ADMIN — DOCUSATE SODIUM SCH MG: 100 CAPSULE, LIQUID FILLED ORAL at 09:08

## 2020-12-12 RX ADMIN — KETOROLAC TROMETHAMINE SCH MG: 30 INJECTION, SOLUTION INTRAMUSCULAR at 01:13

## 2020-12-12 NOTE — PDOC DISCHARGE SUMMARY
Impression





- Admit/DC Date/PCP


Discharge Date: 12/12/20





- Discharge Diagnosis


(1) Abnormal uterine and vaginal bleeding, unspecified


Is this a current diagnosis for this admission?: Yes   





(2) Overflow stress urinary incontinence in female


Is this a current diagnosis for this admission?: Yes   





(3) Uterovaginal prolapse


Is this a current diagnosis for this admission?: Yes   





(4) Pelvic pain


Is this a current diagnosis for this admission?: Yes   





- Assessment


Summary: 


patient underwent a TVH w/ anterior repair, Uterosacral suspension and errol sling

placement.  She has had an unremarkable post operative course and is voiding 

normally without difficulty.  no nausea or vominting.  She is ready for 

discharge home.





- Additional Information


Resuscitation Status: Full Code


Discharge Diet: As Tolerated


Discharge Activity: Balance Activity w/Rest, No Driving, No Lifting Over 10 

Pounds, No Lifting/Push/Pulling, Pelvic Rest, No tub bath, Walk Frequently


Prescriptions: 


Oxycodone HCl/Acetaminophen [Percocet 5-325 mg Tablet] 1 tab PO Q4HP PRN #30 

tablet


 PRN Reason: 


Docusate Sodium [Colace 100 mg Capsule] 100 mg PO BID #60 capsule


Ibuprofen [Motrin 800 mg Tablet] 800 mg PO Q8H #60 tablet


Home Medications: 








Lisinopril/Hydrochlorothiazide [Lisinopril-Hctz 10-12.5 mg Tab] 1 each PO DAILY 

12/08/20 


Docusate Sodium [Colace 100 mg Capsule] 100 mg PO BID #60 capsule 12/12/20 


Ibuprofen [Motrin 800 mg Tablet] 800 mg PO Q8H #60 tablet 12/12/20 


Oxycodone HCl/Acetaminophen [Percocet 5-325 mg Tablet] 1 tab PO Q4HP PRN #30 

tablet 12/12/20 











History of Present Illiness


History of Present Illness: 


NICOLE IRBY is a 40 year old female








Physical Exam





- Physical Exam


Vital Signs: 


                                        











Temp Pulse Resp BP Pulse Ox


 


 98.8 F   80   18   117/62   99 


 


 12/12/20 07:36  12/12/20 04:00  12/12/20 04:00  12/12/20 04:00  12/12/20 04:00








                                 Intake & Output











 12/11/20 12/12/20 12/13/20





 06:59 06:59 06:59


 


Intake Total 0 1920 


 


Output Total  3875 200


 


Balance 0 -1955 -200


 


Weight 68.04 kg 67 kg 














Results


Laboratory Results: 


                                        











WBC  8.3 10^3/uL (4.0-10.5)   12/12/20  06:19    


 


RBC  3.75 10^6/uL (3.72-5.28)   12/12/20  06:19    


 


Hgb  11.5 g/dL (12.0-15.5)  L  12/12/20  06:19    


 


Hct  34.6 % (36.0-47.0)  L  12/12/20  06:19    


 


MCV  93 fl (80-97)   12/12/20  06:19    


 


MCH  30.7 pg (27.0-33.4)   12/12/20  06:19    


 


MCHC  33.2 g/dL (32.0-36.0)   12/12/20  06:19    


 


RDW  14.6 % (11.5-14.0)  H  12/12/20  06:19    


 


Plt Count  282 10^3/uL (150-450)   12/12/20  06:19    


 


Sodium  137.9 mmol/L (137-145)   12/08/20  10:06    


 


Potassium  5.0 mmol/L (3.6-5.0)   12/08/20  10:06    


 


Chloride  100 mmol/L ()   12/08/20  10:06    


 


Carbon Dioxide  26 mmol/L (22-30)   12/08/20  10:06    


 


Anion Gap  12  (5-19)   12/08/20  10:06    


 


BUN  9 mg/dL (7-20)   12/08/20  10:06    


 


Creatinine  0.57 mg/dL (0.52-1.25)   12/08/20  10:06    


 


Est GFR ( Amer)  > 60  (>60)   12/08/20  10:06    


 


Est GFR (MDRD) Non-Af  > 60  (>60)   12/08/20  10:06    


 


Glucose  85 mg/dL ()   12/08/20  10:06    


 


Calcium  10.9 mg/dL (8.4-10.2)  H  12/08/20  10:06    


 


Total Bilirubin  0.7 mg/dL (0.2-1.3)   12/08/20  10:06    


 


Direct Bilirubin  0.2 mg/dL (0.0-0.4)   12/08/20  10:06    


 


Neonat Total Bilirubin  Not Reportable   12/08/20  10:06    


 


Neonat Direct Bilirubin  Not Reportable   12/08/20  10:06    


 


Neonat Indirect Bili  Not Reportable   12/08/20  10:06    


 


AST  31 U/L (14-36)   12/08/20  10:06    


 


ALT  20 U/L (<35)   12/08/20  10:06    


 


Alkaline Phosphatase  59 U/L ()   12/08/20  10:06    


 


Total Protein  8.3 g/dL (6.3-8.2)  H  12/08/20  10:06    


 


Albumin  5.0 g/dL (3.5-5.0)   12/08/20  10:06    


 


Urine Color  STRAW   12/08/20  09:50    


 


Urine Appearance  CLEAR   12/08/20  09:50    


 


Urine pH  6.0  (5.0-9.0)   12/08/20  09:50    


 


Ur Specific Gravity  1.006   12/08/20  09:50    


 


Urine Protein  NEGATIVE mg/dL (NEGATIVE)   12/08/20  09:50    


 


Urine Glucose (UA)  NEGATIVE mg/dL (NEGATIVE)   12/08/20  09:50    


 


Urine Ketones  20 mg/dL (NEGATIVE)  H  12/08/20  09:50    


 


Urine Blood  NEGATIVE  (NEGATIVE)   12/08/20  09:50    


 


Urine Nitrite  NEGATIVE  (NEGATIVE)   12/08/20  09:50    


 


Urine Bilirubin  NEGATIVE  (NEGATIVE)   12/08/20  09:50    


 


Urine Urobilinogen  NEGATIVE mg/dL (<2.0)   12/08/20  09:50    


 


Ur Leukocyte Esterase  NEGATIVE  (NEGATIVE)   12/08/20  09:50    


 


Urine WBC (Auto)  0 /HPF  12/08/20  09:50    


 


Urine RBC (Auto)  0 /HPF  12/08/20  09:50    


 


Urine Bacteria (Auto)  TRACE /HPF  12/08/20  09:50    


 


Squamous Epi Cells Auto  <1 /HPF  12/08/20  09:50    


 


Urine Mucus (Auto)  RARE /LPF  12/08/20  09:50    


 


Urine Ascorbic Acid  NEGATIVE  (NEGATIVE)   12/08/20  09:50    


 


Urine HCG, Qual  NEGATIVE  (NEGATIVE)   12/11/20  05:30    


 


COVID-19 Source  See comment   12/08/20  09:58    


 


COVID-19 ([facility])  Not Detected  (Not Detect)   12/08/20  09:58    


 


Blood Type  B POSITIVE   12/08/20  10:06    


 


Antibody Screen  NEGATIVE   12/08/20  10:06    














Stroke


Is this a Stroke Patient?: No





Acute Heart Failure


Is this a Heart Failure Patient?: No